# Patient Record
Sex: MALE | Race: WHITE | Employment: FULL TIME | URBAN - METROPOLITAN AREA
[De-identification: names, ages, dates, MRNs, and addresses within clinical notes are randomized per-mention and may not be internally consistent; named-entity substitution may affect disease eponyms.]

---

## 2019-12-14 ENCOUNTER — APPOINTMENT (OUTPATIENT)
Dept: NON INVASIVE DIAGNOSTICS | Age: 49
DRG: 247 | End: 2019-12-14
Attending: INTERNAL MEDICINE
Payer: COMMERCIAL

## 2019-12-14 ENCOUNTER — HOSPITAL ENCOUNTER (INPATIENT)
Age: 49
LOS: 3 days | Discharge: HOME | DRG: 247 | End: 2019-12-17
Attending: EMERGENCY MEDICINE | Admitting: HOSPITALIST
Payer: COMMERCIAL

## 2019-12-14 ENCOUNTER — APPOINTMENT (OUTPATIENT)
Dept: GENERAL RADIOLOGY | Age: 49
DRG: 247 | End: 2019-12-14
Attending: EMERGENCY MEDICINE
Payer: COMMERCIAL

## 2019-12-14 DIAGNOSIS — I21.4 NON-STEMI (NON-ST ELEVATED MYOCARDIAL INFARCTION) (HCC): ICD-10-CM

## 2019-12-14 DIAGNOSIS — I24.9 ACS (ACUTE CORONARY SYNDROME) (HCC): ICD-10-CM

## 2019-12-14 DIAGNOSIS — I21.4 NSTEMI (NON-ST ELEVATED MYOCARDIAL INFARCTION) (HCC): Primary | ICD-10-CM

## 2019-12-14 LAB
ALBUMIN SERPL-MCNC: 3.8 G/DL (ref 3.5–5)
ALBUMIN/GLOB SERPL: 1.2 {RATIO} (ref 1.1–2.2)
ALP SERPL-CCNC: 118 U/L (ref 45–117)
ALT SERPL-CCNC: 24 U/L (ref 12–78)
ANION GAP SERPL CALC-SCNC: 7 MMOL/L (ref 5–15)
ANION GAP SERPL CALC-SCNC: 8 MMOL/L (ref 5–15)
APTT PPP: 23.7 SEC (ref 22.1–32)
AST SERPL-CCNC: 15 U/L (ref 15–37)
ATRIAL RATE: 62 BPM
ATRIAL RATE: 68 BPM
AV VELOCITY RATIO: 1.03
AV VTI RATIO: 1
BASOPHILS # BLD: 0 K/UL (ref 0–0.1)
BASOPHILS # BLD: 0 K/UL (ref 0–0.1)
BASOPHILS NFR BLD: 0 % (ref 0–1)
BASOPHILS NFR BLD: 0 % (ref 0–1)
BILIRUB SERPL-MCNC: 0.6 MG/DL (ref 0.2–1)
BUN SERPL-MCNC: 15 MG/DL (ref 6–20)
BUN SERPL-MCNC: 16 MG/DL (ref 6–20)
BUN/CREAT SERPL: 16 (ref 12–20)
BUN/CREAT SERPL: 19 (ref 12–20)
CALCIUM SERPL-MCNC: 9 MG/DL (ref 8.5–10.1)
CALCIUM SERPL-MCNC: 9.1 MG/DL (ref 8.5–10.1)
CALCULATED P AXIS, ECG09: 17 DEGREES
CALCULATED P AXIS, ECG09: 22 DEGREES
CALCULATED R AXIS, ECG10: 23 DEGREES
CALCULATED R AXIS, ECG10: 52 DEGREES
CALCULATED T AXIS, ECG11: 68 DEGREES
CALCULATED T AXIS, ECG11: 79 DEGREES
CHLORIDE SERPL-SCNC: 103 MMOL/L (ref 97–108)
CHLORIDE SERPL-SCNC: 105 MMOL/L (ref 97–108)
CHOLEST SERPL-MCNC: 139 MG/DL
CK SERPL-CCNC: 138 U/L (ref 39–308)
CO2 SERPL-SCNC: 26 MMOL/L (ref 21–32)
CO2 SERPL-SCNC: 28 MMOL/L (ref 21–32)
CREAT SERPL-MCNC: 0.79 MG/DL (ref 0.7–1.3)
CREAT SERPL-MCNC: 0.98 MG/DL (ref 0.7–1.3)
DIAGNOSIS, 93000: NORMAL
DIAGNOSIS, 93000: NORMAL
DIFFERENTIAL METHOD BLD: NORMAL
DIFFERENTIAL METHOD BLD: NORMAL
ECHO AO ROOT DIAM: 3.61 CM
ECHO AV AREA PEAK VELOCITY: 3.9 CM2
ECHO AV AREA VTI: 3.9 CM2
ECHO AV MEAN GRADIENT: 2.9 MMHG
ECHO AV MEAN VELOCITY: 0.84 M/S
ECHO AV PEAK GRADIENT: 4.3 MMHG
ECHO AV PEAK VELOCITY: 103.91 CM/S
ECHO AV VTI: 19.22 CM
ECHO LA MAJOR AXIS: 5.89 CM
ECHO LA TO AORTIC ROOT RATIO: 1.63
ECHO LV E' LATERAL VELOCITY: 9.05 CM/S
ECHO LV E' SEPTAL VELOCITY: 9.21 CM/S
ECHO LV INTERNAL DIMENSION DIASTOLIC: 4.8 CM (ref 4.2–5.9)
ECHO LV INTERNAL DIMENSION SYSTOLIC: 3.06 CM
ECHO LV IVSD: 1.47 CM (ref 0.6–1)
ECHO LV MASS 2D: 344 G (ref 88–224)
ECHO LV MASS INDEX 2D: 134.3 G/M2 (ref 49–115)
ECHO LV POSTERIOR WALL DIASTOLIC: 1.42 CM (ref 0.6–1)
ECHO LVOT CARDIAC OUTPUT: 4.4 L/MIN
ECHO LVOT DIAM: 2.19 CM
ECHO LVOT PEAK GRADIENT: 4.6 MMHG
ECHO LVOT PEAK VELOCITY: 107.37 CM/S
ECHO LVOT SV: 74.3 ML
ECHO LVOT VTI: 19.78 CM
ECHO MV A VELOCITY: 52.61 CM/S
ECHO MV AREA PHT: 4 CM2
ECHO MV AREA VTI: 4.5 CM2
ECHO MV E DECELERATION TIME (DT): 210.2 MS
ECHO MV E VELOCITY: 47.4 CM/S
ECHO MV E/A RATIO: 0.9
ECHO MV E/E' LATERAL: 5.24
ECHO MV E/E' RATIO (AVERAGED): 5.19
ECHO MV E/E' SEPTAL: 5.15
ECHO MV MAX VELOCITY: 54.35 CM/S
ECHO MV MEAN GRADIENT: 0.4 MMHG
ECHO MV MEAN INFLOW VELOCITY: 0.28 M/S
ECHO MV PEAK GRADIENT: 1.2 MMHG
ECHO MV PRESSURE HALF TIME (PHT): 55.2 MS
ECHO MV VTI: 16.58 CM
ECHO PV MAX VELOCITY: 95.02 CM/S
ECHO PV MEAN GRADIENT: 2.1 MMHG
ECHO PV PEAK GRADIENT: 3.6 MMHG
ECHO PV VTI: 16.66 CM
ECHO TV A WAVE: 47.15 CM/S
ECHO TV E WAVE: 57.18 CM/S
ECHO TV EROA: 0.8
EOSINOPHIL # BLD: 0.1 K/UL (ref 0–0.4)
EOSINOPHIL # BLD: 0.1 K/UL (ref 0–0.4)
EOSINOPHIL NFR BLD: 1 % (ref 0–7)
EOSINOPHIL NFR BLD: 1 % (ref 0–7)
ERYTHROCYTE [DISTWIDTH] IN BLOOD BY AUTOMATED COUNT: 12.3 % (ref 11.5–14.5)
ERYTHROCYTE [DISTWIDTH] IN BLOOD BY AUTOMATED COUNT: 12.5 % (ref 11.5–14.5)
EST. AVERAGE GLUCOSE BLD GHB EST-MCNC: 295 MG/DL
GLOBULIN SER CALC-MCNC: 3.1 G/DL (ref 2–4)
GLUCOSE BLD STRIP.AUTO-MCNC: 189 MG/DL (ref 65–100)
GLUCOSE BLD STRIP.AUTO-MCNC: 222 MG/DL (ref 65–100)
GLUCOSE BLD STRIP.AUTO-MCNC: 272 MG/DL (ref 65–100)
GLUCOSE BLD STRIP.AUTO-MCNC: 273 MG/DL (ref 65–100)
GLUCOSE SERPL-MCNC: 305 MG/DL (ref 65–100)
GLUCOSE SERPL-MCNC: 374 MG/DL (ref 65–100)
HBA1C MFR BLD: 11.9 % (ref 4–5.6)
HCT VFR BLD AUTO: 43.6 % (ref 36.6–50.3)
HCT VFR BLD AUTO: 47.3 % (ref 36.6–50.3)
HDLC SERPL-MCNC: 38 MG/DL
HDLC SERPL: 3.7 {RATIO} (ref 0–5)
HGB BLD-MCNC: 14.8 G/DL (ref 12.1–17)
HGB BLD-MCNC: 15.7 G/DL (ref 12.1–17)
IMM GRANULOCYTES # BLD AUTO: 0 K/UL (ref 0–0.04)
IMM GRANULOCYTES # BLD AUTO: 0 K/UL (ref 0–0.04)
IMM GRANULOCYTES NFR BLD AUTO: 0 % (ref 0–0.5)
IMM GRANULOCYTES NFR BLD AUTO: 0 % (ref 0–0.5)
INR BLD: 1 (ref 0.9–1.2)
LDLC SERPL CALC-MCNC: 71.4 MG/DL (ref 0–100)
LIPID PROFILE,FLP: NORMAL
LVFS 2D: 36.34 %
LVOT MG: 2.35 MMHG
LVOT MV: 0.72 CM/S
LYMPHOCYTES # BLD: 3.1 K/UL (ref 0.8–3.5)
LYMPHOCYTES # BLD: 3.1 K/UL (ref 0.8–3.5)
LYMPHOCYTES NFR BLD: 34 % (ref 12–49)
LYMPHOCYTES NFR BLD: 34 % (ref 12–49)
MCH RBC QN AUTO: 29.7 PG (ref 26–34)
MCH RBC QN AUTO: 29.9 PG (ref 26–34)
MCHC RBC AUTO-ENTMCNC: 33.2 G/DL (ref 30–36.5)
MCHC RBC AUTO-ENTMCNC: 33.9 G/DL (ref 30–36.5)
MCV RBC AUTO: 88.1 FL (ref 80–99)
MCV RBC AUTO: 89.6 FL (ref 80–99)
MONOCYTES # BLD: 0.7 K/UL (ref 0–1)
MONOCYTES # BLD: 0.7 K/UL (ref 0–1)
MONOCYTES NFR BLD: 7 % (ref 5–13)
MONOCYTES NFR BLD: 8 % (ref 5–13)
MV DEC SLOPE: 2.25
NEUTS SEG # BLD: 5.2 K/UL (ref 1.8–8)
NEUTS SEG # BLD: 5.3 K/UL (ref 1.8–8)
NEUTS SEG NFR BLD: 57 % (ref 32–75)
NEUTS SEG NFR BLD: 58 % (ref 32–75)
NRBC # BLD: 0 K/UL (ref 0–0.01)
NRBC # BLD: 0 K/UL (ref 0–0.01)
NRBC BLD-RTO: 0 PER 100 WBC
NRBC BLD-RTO: 0 PER 100 WBC
P-R INTERVAL, ECG05: 172 MS
P-R INTERVAL, ECG05: 180 MS
PLATELET # BLD AUTO: 279 K/UL (ref 150–400)
PLATELET # BLD AUTO: 309 K/UL (ref 150–400)
PMV BLD AUTO: 9.8 FL (ref 8.9–12.9)
PMV BLD AUTO: 9.8 FL (ref 8.9–12.9)
POTASSIUM SERPL-SCNC: 3.9 MMOL/L (ref 3.5–5.1)
POTASSIUM SERPL-SCNC: 4.3 MMOL/L (ref 3.5–5.1)
PROT SERPL-MCNC: 6.9 G/DL (ref 6.4–8.2)
Q-T INTERVAL, ECG07: 396 MS
Q-T INTERVAL, ECG07: 402 MS
QRS DURATION, ECG06: 84 MS
QRS DURATION, ECG06: 88 MS
QTC CALCULATION (BEZET), ECG08: 401 MS
QTC CALCULATION (BEZET), ECG08: 427 MS
RBC # BLD AUTO: 4.95 M/UL (ref 4.1–5.7)
RBC # BLD AUTO: 5.28 M/UL (ref 4.1–5.7)
SERVICE CMNT-IMP: ABNORMAL
SODIUM SERPL-SCNC: 138 MMOL/L (ref 136–145)
SODIUM SERPL-SCNC: 139 MMOL/L (ref 136–145)
THERAPEUTIC RANGE,PTTT: NORMAL SECS (ref 58–77)
TRIGL SERPL-MCNC: 148 MG/DL (ref ?–150)
TROPONIN I SERPL-MCNC: 1.6 NG/ML
TROPONIN I SERPL-MCNC: 1.87 NG/ML
VENTRICULAR RATE, ECG03: 62 BPM
VENTRICULAR RATE, ECG03: 68 BPM
VLDLC SERPL CALC-MCNC: 29.6 MG/DL
WBC # BLD AUTO: 9.1 K/UL (ref 4.1–11.1)
WBC # BLD AUTO: 9.2 K/UL (ref 4.1–11.1)

## 2019-12-14 PROCEDURE — 74011250636 HC RX REV CODE- 250/636: Performed by: EMERGENCY MEDICINE

## 2019-12-14 PROCEDURE — 83036 HEMOGLOBIN GLYCOSYLATED A1C: CPT

## 2019-12-14 PROCEDURE — 84484 ASSAY OF TROPONIN QUANT: CPT

## 2019-12-14 PROCEDURE — 99283 EMERGENCY DEPT VISIT LOW MDM: CPT

## 2019-12-14 PROCEDURE — 85610 PROTHROMBIN TIME: CPT

## 2019-12-14 PROCEDURE — 99153 MOD SED SAME PHYS/QHP EA: CPT | Performed by: INTERNAL MEDICINE

## 2019-12-14 PROCEDURE — 85730 THROMBOPLASTIN TIME PARTIAL: CPT

## 2019-12-14 PROCEDURE — 74011636320 HC RX REV CODE- 636/320: Performed by: INTERNAL MEDICINE

## 2019-12-14 PROCEDURE — 74011250637 HC RX REV CODE- 250/637: Performed by: EMERGENCY MEDICINE

## 2019-12-14 PROCEDURE — B2131ZZ FLUOROSCOPY OF MULTIPLE CORONARY ARTERY BYPASS GRAFTS USING LOW OSMOLAR CONTRAST: ICD-10-PCS | Performed by: INTERNAL MEDICINE

## 2019-12-14 PROCEDURE — C1887 CATHETER, GUIDING: HCPCS | Performed by: INTERNAL MEDICINE

## 2019-12-14 PROCEDURE — 71046 X-RAY EXAM CHEST 2 VIEWS: CPT

## 2019-12-14 PROCEDURE — C1725 CATH, TRANSLUMIN NON-LASER: HCPCS | Performed by: INTERNAL MEDICINE

## 2019-12-14 PROCEDURE — 80061 LIPID PANEL: CPT

## 2019-12-14 PROCEDURE — 65660000000 HC RM CCU STEPDOWN

## 2019-12-14 PROCEDURE — 74011000250 HC RX REV CODE- 250: Performed by: INTERNAL MEDICINE

## 2019-12-14 PROCEDURE — 77030013797 HC KT TRNSDUC PRSSR EDWD -A: Performed by: INTERNAL MEDICINE

## 2019-12-14 PROCEDURE — 74011250636 HC RX REV CODE- 250/636: Performed by: INTERNAL MEDICINE

## 2019-12-14 PROCEDURE — 77030004549 HC CATH ANGI DX PRF MRTM -A: Performed by: INTERNAL MEDICINE

## 2019-12-14 PROCEDURE — C1769 GUIDE WIRE: HCPCS | Performed by: INTERNAL MEDICINE

## 2019-12-14 PROCEDURE — B2151ZZ FLUOROSCOPY OF LEFT HEART USING LOW OSMOLAR CONTRAST: ICD-10-PCS | Performed by: INTERNAL MEDICINE

## 2019-12-14 PROCEDURE — C8929 TTE W OR WO FOL WCON,DOPPLER: HCPCS

## 2019-12-14 PROCEDURE — 77030002996 HC SUT SLK J&J -A: Performed by: INTERNAL MEDICINE

## 2019-12-14 PROCEDURE — 74011250637 HC RX REV CODE- 250/637: Performed by: HOSPITALIST

## 2019-12-14 PROCEDURE — 74011250637 HC RX REV CODE- 250/637: Performed by: INTERNAL MEDICINE

## 2019-12-14 PROCEDURE — 82550 ASSAY OF CK (CPK): CPT

## 2019-12-14 PROCEDURE — 92943 PRQ TRLUML REVSC CH OCC ANT: CPT | Performed by: INTERNAL MEDICINE

## 2019-12-14 PROCEDURE — C1874 STENT, COATED/COV W/DEL SYS: HCPCS | Performed by: INTERNAL MEDICINE

## 2019-12-14 PROCEDURE — 93459 L HRT ART/GRFT ANGIO: CPT | Performed by: INTERNAL MEDICINE

## 2019-12-14 PROCEDURE — 93005 ELECTROCARDIOGRAM TRACING: CPT

## 2019-12-14 PROCEDURE — B2111ZZ FLUOROSCOPY OF MULTIPLE CORONARY ARTERIES USING LOW OSMOLAR CONTRAST: ICD-10-PCS | Performed by: INTERNAL MEDICINE

## 2019-12-14 PROCEDURE — 4A023N7 MEASUREMENT OF CARDIAC SAMPLING AND PRESSURE, LEFT HEART, PERCUTANEOUS APPROACH: ICD-10-PCS | Performed by: INTERNAL MEDICINE

## 2019-12-14 PROCEDURE — 36415 COLL VENOUS BLD VENIPUNCTURE: CPT

## 2019-12-14 PROCEDURE — 77030004543 HC CATH ANGI DX MRTM -A: Performed by: INTERNAL MEDICINE

## 2019-12-14 PROCEDURE — 77030030195 HC CATH ANGI DX PRF4 MRTM -A: Performed by: INTERNAL MEDICINE

## 2019-12-14 PROCEDURE — 027035Z DILATION OF CORONARY ARTERY, ONE ARTERY WITH TWO DRUG-ELUTING INTRALUMINAL DEVICES, PERCUTANEOUS APPROACH: ICD-10-PCS | Performed by: INTERNAL MEDICINE

## 2019-12-14 PROCEDURE — 77030018729 HC ELECTRD DEFIB PAD CARD -B: Performed by: INTERNAL MEDICINE

## 2019-12-14 PROCEDURE — 74011000258 HC RX REV CODE- 258: Performed by: INTERNAL MEDICINE

## 2019-12-14 PROCEDURE — 74011636637 HC RX REV CODE- 636/637: Performed by: HOSPITALIST

## 2019-12-14 PROCEDURE — 80053 COMPREHEN METABOLIC PANEL: CPT

## 2019-12-14 PROCEDURE — 85025 COMPLETE CBC W/AUTO DIFF WBC: CPT

## 2019-12-14 PROCEDURE — 99152 MOD SED SAME PHYS/QHP 5/>YRS: CPT | Performed by: INTERNAL MEDICINE

## 2019-12-14 PROCEDURE — C1894 INTRO/SHEATH, NON-LASER: HCPCS | Performed by: INTERNAL MEDICINE

## 2019-12-14 PROCEDURE — 82962 GLUCOSE BLOOD TEST: CPT

## 2019-12-14 DEVICE — STENT RONYX25038UX RESOLUTE ONYX 2.50X38
Type: IMPLANTABLE DEVICE | Status: FUNCTIONAL
Brand: RESOLUTE ONYX™

## 2019-12-14 RX ORDER — HEPARIN SODIUM 200 [USP'U]/100ML
INJECTION, SOLUTION INTRAVENOUS
Status: COMPLETED | OUTPATIENT
Start: 2019-12-14 | End: 2019-12-14

## 2019-12-14 RX ORDER — OXYCODONE HYDROCHLORIDE 5 MG/1
5 TABLET ORAL
Status: DISCONTINUED | OUTPATIENT
Start: 2019-12-14 | End: 2019-12-17 | Stop reason: HOSPADM

## 2019-12-14 RX ORDER — SODIUM CHLORIDE 0.9 % (FLUSH) 0.9 %
5-40 SYRINGE (ML) INJECTION AS NEEDED
Status: DISCONTINUED | OUTPATIENT
Start: 2019-12-14 | End: 2019-12-14 | Stop reason: SDUPTHER

## 2019-12-14 RX ORDER — INSULIN LISPRO 100 [IU]/ML
INJECTION, SOLUTION INTRAVENOUS; SUBCUTANEOUS
Status: DISCONTINUED | OUTPATIENT
Start: 2019-12-14 | End: 2019-12-17 | Stop reason: HOSPADM

## 2019-12-14 RX ORDER — FENTANYL CITRATE 50 UG/ML
25 INJECTION, SOLUTION INTRAMUSCULAR; INTRAVENOUS
Status: DISCONTINUED | OUTPATIENT
Start: 2019-12-14 | End: 2019-12-17 | Stop reason: HOSPADM

## 2019-12-14 RX ORDER — SODIUM CHLORIDE 0.9 % (FLUSH) 0.9 %
5-40 SYRINGE (ML) INJECTION AS NEEDED
Status: DISCONTINUED | OUTPATIENT
Start: 2019-12-14 | End: 2019-12-17 | Stop reason: HOSPADM

## 2019-12-14 RX ORDER — ASPIRIN 81 MG/1
81 TABLET ORAL DAILY
Status: DISCONTINUED | OUTPATIENT
Start: 2019-12-14 | End: 2019-12-17 | Stop reason: HOSPADM

## 2019-12-14 RX ORDER — MIDAZOLAM HYDROCHLORIDE 1 MG/ML
INJECTION, SOLUTION INTRAMUSCULAR; INTRAVENOUS AS NEEDED
Status: DISCONTINUED | OUTPATIENT
Start: 2019-12-14 | End: 2019-12-14 | Stop reason: HOSPADM

## 2019-12-14 RX ORDER — ACETAMINOPHEN 325 MG/1
650 TABLET ORAL
Status: DISCONTINUED | OUTPATIENT
Start: 2019-12-14 | End: 2019-12-17 | Stop reason: HOSPADM

## 2019-12-14 RX ORDER — HEPARIN SODIUM 5000 [USP'U]/ML
4000 INJECTION, SOLUTION INTRAVENOUS; SUBCUTANEOUS AS NEEDED
Status: DISCONTINUED | OUTPATIENT
Start: 2019-12-14 | End: 2019-12-17 | Stop reason: HOSPADM

## 2019-12-14 RX ORDER — ENOXAPARIN SODIUM 100 MG/ML
1 INJECTION SUBCUTANEOUS
Status: DISCONTINUED | OUTPATIENT
Start: 2019-12-14 | End: 2019-12-14

## 2019-12-14 RX ORDER — ONDANSETRON 2 MG/ML
4 INJECTION INTRAMUSCULAR; INTRAVENOUS
Status: DISCONTINUED | OUTPATIENT
Start: 2019-12-14 | End: 2019-12-17 | Stop reason: HOSPADM

## 2019-12-14 RX ORDER — ATORVASTATIN CALCIUM 40 MG/1
40 TABLET, FILM COATED ORAL
Status: DISCONTINUED | OUTPATIENT
Start: 2019-12-14 | End: 2019-12-17 | Stop reason: HOSPADM

## 2019-12-14 RX ORDER — SODIUM CHLORIDE 9 MG/ML
100 INJECTION, SOLUTION INTRAVENOUS CONTINUOUS
Status: DISPENSED | OUTPATIENT
Start: 2019-12-14 | End: 2019-12-14

## 2019-12-14 RX ORDER — METOPROLOL TARTRATE 25 MG/1
25 TABLET, FILM COATED ORAL EVERY 12 HOURS
Status: DISCONTINUED | OUTPATIENT
Start: 2019-12-14 | End: 2019-12-17 | Stop reason: HOSPADM

## 2019-12-14 RX ORDER — SODIUM CHLORIDE 0.9 % (FLUSH) 0.9 %
5-40 SYRINGE (ML) INJECTION EVERY 8 HOURS
Status: DISCONTINUED | OUTPATIENT
Start: 2019-12-14 | End: 2019-12-14 | Stop reason: SDUPTHER

## 2019-12-14 RX ORDER — METFORMIN HYDROCHLORIDE 1000 MG/1
1000 TABLET, FILM COATED, EXTENDED RELEASE ORAL 2 TIMES DAILY
COMMUNITY

## 2019-12-14 RX ORDER — LIDOCAINE HYDROCHLORIDE 10 MG/ML
INJECTION, SOLUTION EPIDURAL; INFILTRATION; INTRACAUDAL; PERINEURAL AS NEEDED
Status: DISCONTINUED | OUTPATIENT
Start: 2019-12-14 | End: 2019-12-14 | Stop reason: HOSPADM

## 2019-12-14 RX ORDER — DEXTROSE 50 % IN WATER (D50W) INTRAVENOUS SYRINGE
12.5-25 AS NEEDED
Status: DISCONTINUED | OUTPATIENT
Start: 2019-12-14 | End: 2019-12-17 | Stop reason: HOSPADM

## 2019-12-14 RX ORDER — MAGNESIUM SULFATE 100 %
4 CRYSTALS MISCELLANEOUS AS NEEDED
Status: DISCONTINUED | OUTPATIENT
Start: 2019-12-14 | End: 2019-12-17 | Stop reason: HOSPADM

## 2019-12-14 RX ORDER — FENTANYL CITRATE 50 UG/ML
INJECTION, SOLUTION INTRAMUSCULAR; INTRAVENOUS AS NEEDED
Status: DISCONTINUED | OUTPATIENT
Start: 2019-12-14 | End: 2019-12-14 | Stop reason: HOSPADM

## 2019-12-14 RX ORDER — HEPARIN SODIUM 5000 [USP'U]/ML
4000 INJECTION, SOLUTION INTRAVENOUS; SUBCUTANEOUS ONCE
Status: COMPLETED | OUTPATIENT
Start: 2019-12-14 | End: 2019-12-14

## 2019-12-14 RX ORDER — HEPARIN SODIUM 5000 [USP'U]/ML
2000 INJECTION, SOLUTION INTRAVENOUS; SUBCUTANEOUS AS NEEDED
Status: DISCONTINUED | OUTPATIENT
Start: 2019-12-14 | End: 2019-12-17 | Stop reason: HOSPADM

## 2019-12-14 RX ORDER — SODIUM CHLORIDE 0.9 % (FLUSH) 0.9 %
5-40 SYRINGE (ML) INJECTION EVERY 8 HOURS
Status: DISCONTINUED | OUTPATIENT
Start: 2019-12-14 | End: 2019-12-17 | Stop reason: HOSPADM

## 2019-12-14 RX ORDER — HEPARIN SODIUM 10000 [USP'U]/100ML
7-25 INJECTION, SOLUTION INTRAVENOUS
Status: DISCONTINUED | OUTPATIENT
Start: 2019-12-14 | End: 2019-12-17 | Stop reason: HOSPADM

## 2019-12-14 RX ORDER — INSULIN GLARGINE 100 [IU]/ML
20 INJECTION, SOLUTION SUBCUTANEOUS DAILY
Status: DISCONTINUED | OUTPATIENT
Start: 2019-12-14 | End: 2019-12-17 | Stop reason: HOSPADM

## 2019-12-14 RX ADMIN — INSULIN LISPRO 7 UNITS: 100 INJECTION, SOLUTION INTRAVENOUS; SUBCUTANEOUS at 08:30

## 2019-12-14 RX ADMIN — Medication 10 ML: at 22:04

## 2019-12-14 RX ADMIN — METOPROLOL TARTRATE 25 MG: 25 TABLET ORAL at 20:52

## 2019-12-14 RX ADMIN — BIVALIRUDIN 0.5 MG/KG/HR: 250 INJECTION, POWDER, LYOPHILIZED, FOR SOLUTION INTRAVENOUS at 12:15

## 2019-12-14 RX ADMIN — SODIUM CHLORIDE 100 ML/HR: 900 INJECTION, SOLUTION INTRAVENOUS at 09:43

## 2019-12-14 RX ADMIN — HEPARIN SODIUM 4000 UNITS: 5000 INJECTION INTRAVENOUS; SUBCUTANEOUS at 05:04

## 2019-12-14 RX ADMIN — METOPROLOL TARTRATE 25 MG: 25 TABLET ORAL at 08:30

## 2019-12-14 RX ADMIN — ASPIRIN 81 MG: 81 TABLET, COATED ORAL at 08:30

## 2019-12-14 RX ADMIN — PERFLUTREN 2 ML: 6.52 INJECTION, SUSPENSION INTRAVENOUS at 14:38

## 2019-12-14 RX ADMIN — HEPARIN SODIUM AND DEXTROSE 7 UNITS/KG/HR: 10000; 5 INJECTION INTRAVENOUS at 05:39

## 2019-12-14 RX ADMIN — NITROGLYCERIN 1 INCH: 20 OINTMENT TOPICAL at 04:24

## 2019-12-14 RX ADMIN — ACETAMINOPHEN 650 MG: 325 TABLET ORAL at 20:52

## 2019-12-14 RX ADMIN — INSULIN GLARGINE 20 UNITS: 100 INJECTION, SOLUTION SUBCUTANEOUS at 06:01

## 2019-12-14 RX ADMIN — TICAGRELOR 90 MG: 90 TABLET ORAL at 22:02

## 2019-12-14 RX ADMIN — ATORVASTATIN CALCIUM 40 MG: 40 TABLET, FILM COATED ORAL at 05:43

## 2019-12-14 RX ADMIN — ATORVASTATIN CALCIUM 40 MG: 40 TABLET, FILM COATED ORAL at 22:02

## 2019-12-14 RX ADMIN — INSULIN LISPRO 4 UNITS: 100 INJECTION, SOLUTION INTRAVENOUS; SUBCUTANEOUS at 22:03

## 2019-12-14 NOTE — ED PROVIDER NOTES
EMERGENCY DEPARTMENT HISTORY AND PHYSICAL EXAM      Date: 12/14/2019  Patient Name: Jerilyn Kumari  Patient Age and Sex: 47 y.o. male    History of Presenting Illness     Chief Complaint   Patient presents with    Chest Pain     x40 mins, center radiates to bilateral sides, and left jaw. Hx of 2 MI.  denies fever/chills. Pt reporting SOB with pain. Pt reprots he took an ASA 81mg PTA. History Provided By: Patient    HPI: Jerilyn Kumari, is a 47 y.o. male who has a past medical history of diabetes, hypertension, coronary artery disease, several MIs last of which was about 4 years ago and CABG X2, presents today with anterior dull chest pain, nonradiating, started around midnight when he was going to bed. Pain lasted 45 minutes to an hour and was similar to the pain he experienced during his last heart attack. Took a full aspirin nitro at home and in route to the emergency department which resolved the pain. Currently he is asymptomatic. He has had a few other episodes of exertional chest pain over the past few days. In addition, she has not been compliant with all of his medications, particularly treatment for his diabetes. Pt denies any other alleviating or exacerbating factors. There are no other complaints, changes or physical findings at this time.      Past Medical History:   Diagnosis Date    Diabetes ([address]     Hypertension     MI (myocardial infarction) ([address]     NSTEMI (non-ST elevated myocardial infarction) ([address] 1/6/2016    Obesity 1/6/2016     1/6/2016    Type II diabetes mellitus, uncontrolled ([address] 1/6/2016     Past Surgical History:   Procedure Laterality Date    HX CORONARY STENT PLACEMENT     [facility] ORTHOPAEDIC  [id number]    scopes knees, ankles,        PCP: Yashira Garcia MD    Past History   Past Medical History:  Past Medical History:   Diagnosis Date    Diabetes ([address]     Hypertension     MI (myocardial infarction) ([address]     NSTEMI (non-ST elevated myocardial infarction) ([facility] 1/6/2016    Obesity 1/6/2016     1/6/2016    Type II diabetes mellitus, uncontrolled ([facility] 1/6/2016       Past Surgical History:  Past Surgical History:   Procedure Laterality Date    HX CORONARY STENT PLACEMENT      HX ORTHOPAEDIC  [id number]    scopes knees, ankles,        Family History:  Family History   Problem Relation Age of Onset    Heart Disease Mother     Heart Disease Maternal Grandmother        Social History:  Social History     Tobacco Use    Smoking status: Former Smoker    Smokeless tobacco: Current User   Substance Use Topics    Alcohol use: No    Drug use: Not on file       Allergies: Allergies   Allergen Reactions    Latex Rash       Current Medications:  No current facility-administered medications on file prior to encounter. Current Outpatient Medications on File Prior to Encounter   Medication Sig Dispense Refill    metoprolol tartrate (LOPRESSOR) 25 mg tablet TAKE 1 TABLET BY MOUTH EVERY 12 HOURS 60 Tab 0    glucose blood VI test strips (ONETOUCH ULTRA TEST) strip Use four times daily as directed 100 Strip 1    Insulin Needles, Disposable, (BD INSULIN PEN NEEDLE UF MINI) 31 gauge x 3/16\" ndle Use as directed. May substitute with 5/16\" 100 Pen Needle 1    lisinopril (PRINIVIL, ZESTRIL) 5 mg tablet Take 1 Tab by mouth daily. Indications: HYPERTENSION 30 Tab 11    atorvastatin (LIPITOR) 40 mg tablet Take 1 Tab by mouth nightly. 30 Tab 11    ondansetron (ZOFRAN ODT) 4 mg disintegrating tablet Take 1 Tab by mouth every six (6) hours as needed for up to 20 doses.  20 Tab 1    insulin lispro (HUMALOG) 100 unit/mL injection 10 units SC before meals  Indications: TYPE 2 DIABETES MELLITUS 1 Vial 1    insulin glargine (LANTUS) 100 unit/mL injection 50 units SC daily  Indications: TYPE 2 DIABETES MELLITUS (Patient taking differently: 48 units SC daily  Indications: TYPE 2 DIABETES MELLITUS) 1 Vial 1    HYDROcodone-acetaminophen (NORCO) 5-325 mg per tablet Take 1 Tab by mouth every four (4) hours as needed for Pain. Max Daily Amount: 6 Tabs. 60 Tab 0    lancets (ONETOUCH DELICA LANCETS) 30 gauge misc Use twice daily as directed 100 Package 1    aspirin delayed-release (ECOTRIN LOW STRENGTH) 81 mg tablet Take 1 Tab by mouth daily. 30 Tab 11       Review of Systems   Review of Systems   Constitutional: Negative for appetite change, chills and fever. Respiratory: Negative for cough, chest tightness and shortness of breath. Cardiovascular: Positive for chest pain. Negative for palpitations and leg swelling. Gastrointestinal: Negative for abdominal distention, abdominal pain, blood in stool, constipation, diarrhea, nausea and vomiting. Genitourinary: Negative for decreased urine volume, difficulty urinating, dysuria, flank pain, frequency and hematuria. Musculoskeletal: Negative for arthralgias, joint swelling, myalgias, neck pain and neck stiffness. Neurological: Negative for dizziness, weakness, light-headedness, numbness and headaches. Hematological: Negative for adenopathy. All other systems reviewed and are negative. Physical Exam   Physical Exam  Vitals signs and nursing note reviewed. Constitutional:       Appearance: He is well-developed. He is not diaphoretic. HENT:      Head: Atraumatic. Eyes:      General: No scleral icterus. Conjunctiva/sclera: Conjunctivae normal.      Pupils: Pupils are equal, round, and reactive to light. Neck:      Musculoskeletal: Normal range of motion and neck supple. Vascular: No JVD. Cardiovascular:      Rate and Rhythm: Normal rate and regular rhythm. Heart sounds: Normal heart sounds. Pulmonary:      Effort: Pulmonary effort is normal.      Breath sounds: Normal breath sounds. Chest:      Chest wall: No tenderness. Abdominal:      General: Bowel sounds are normal. There is no distension. Palpations: Abdomen is soft. Tenderness:  There is no tenderness. Musculoskeletal: Normal range of motion. Skin:     General: Skin is warm and dry. Neurological:      Mental Status: He is alert and oriented to person, place, and time. Cranial Nerves: No cranial nerve deficit. Diagnostic Study Results     Labs -  Recent Results (from the past 24 hour(s))   EKG, 12 LEAD, INITIAL    Collection Time: 12/14/19  1:19 AM   Result Value Ref Range    Ventricular Rate 68 BPM    Atrial Rate 68 BPM    P-R Interval 172 ms    QRS Duration 88 ms    Q-T Interval 402 ms    QTC Calculation (Bezet) 427 ms    Calculated P Axis 17 degrees    Calculated R Axis 52 degrees    Calculated T Axis 79 degrees    Diagnosis       Normal sinus rhythm  Anteroseptal infarct , age undetermined  When compared with ECG of 08-JAN-2016 12:30,  Anteroseptal infarct is now present  ST now depressed in Lateral leads  Nonspecific T wave abnormality has replaced inverted T waves in Inferior   leads  Nonspecific T wave abnormality no longer evident in Anterior leads     CBC WITH AUTOMATED DIFF    Collection Time: 12/14/19  2:42 AM   Result Value Ref Range    WBC 9.2 4.1 - 11.1 K/uL    RBC 5.28 4.10 - 5.70 M/uL    HGB 15.7 12.1 - 17.0 g/dL    HCT 47.3 36.6 - 50.3 %    MCV 89.6 80.0 - 99.0 FL    MCH 29.7 26.0 - 34.0 PG    MCHC 33.2 30.0 - 36.5 g/dL    RDW 12.5 11.5 - 14.5 %    PLATELET 729 956 - 486 K/uL    MPV 9.8 8.9 - 12.9 FL    NRBC 0.0 0  WBC    ABSOLUTE NRBC 0.00 0.00 - 0.01 K/uL    NEUTROPHILS 58 32 - 75 %    LYMPHOCYTES 34 12 - 49 %    MONOCYTES 7 5 - 13 %    EOSINOPHILS 1 0 - 7 %    BASOPHILS 0 0 - 1 %    IMMATURE GRANULOCYTES 0 0.0 - 0.5 %    ABS. NEUTROPHILS 5.3 1.8 - 8.0 K/UL    ABS. LYMPHOCYTES 3.1 0.8 - 3.5 K/UL    ABS. MONOCYTES 0.7 0.0 - 1.0 K/UL    ABS. EOSINOPHILS 0.1 0.0 - 0.4 K/UL    ABS. BASOPHILS 0.0 0.0 - 0.1 K/UL    ABS. IMM.  GRANS. 0.0 0.00 - 0.04 K/UL    DF AUTOMATED     METABOLIC PANEL, COMPREHENSIVE    Collection Time: 12/14/19  2:42 AM   Result Value Ref Range Sodium 138 136 - 145 mmol/L    Potassium 4.3 3.5 - 5.1 mmol/L    Chloride 103 97 - 108 mmol/L    CO2 28 21 - 32 mmol/L    Anion gap 7 5 - 15 mmol/L    Glucose 374 (H) 65 - 100 mg/dL    BUN 16 6 - 20 MG/DL    Creatinine 0.98 0.70 - 1.30 MG/DL    BUN/Creatinine ratio 16 12 - 20      GFR est AA >60 >60 ml/min/1.73m2    GFR est non-AA >60 >60 ml/min/1.73m2    Calcium 9.0 8.5 - 10.1 MG/DL    Bilirubin, total 0.6 0.2 - 1.0 MG/DL    ALT (SGPT) 24 12 - 78 U/L    AST (SGOT) 15 15 - 37 U/L    Alk. phosphatase 118 (H) 45 - 117 U/L    Protein, total 6.9 6.4 - 8.2 g/dL    Albumin 3.8 3.5 - 5.0 g/dL    Globulin 3.1 2.0 - 4.0 g/dL    A-G Ratio 1.2 1.1 - 2.2     TROPONIN I    Collection Time: 12/14/19  2:42 AM   Result Value Ref Range    Troponin-I, Qt. 1.60 (H) <0.05 ng/mL   CK W/ REFLX CKMB    Collection Time: 12/14/19  2:42 AM   Result Value Ref Range     39 - 308 U/L       Radiologic Studies -   XR CHEST PA LAT   Final Result   IMPRESSION:   No acute process. XR CHEST PORT    (Results Pending)         Medical Decision Making   I am the first provider for this patient. Records Reviewed: I reviewed our electronic medical record system for any past medical records that were available that may contribute to the patient's current condition, including their PMH, surgical history, social and family history. Reviewed the nursing notes and vital signs from today's visit. Vital Signs-Reviewed the patient's vital signs. Patient Vitals for the past 24 hrs:   Temp Pulse Resp BP SpO2   12/14/19 0114 97.8 °F (36.6 °C) 65 20 154/82 99 %       Pulse Oximetry Analysis - 98% on RA    Cardiac Monitor:   Rate: 65 bpm  Rhythm: Normal Sinus Rhythm      ED ECG interpretation:  ECG shows normal sinus rhythm, with HR 68, normal intervals. 1mm ST elevation in lead III with reciprocal depressions in lateral leads I and aVL.  Does not meet STEMI criteria and the patient is currently asmptomatic, normal vital signs, appears very well. This ECG was interpreted by Maria D Lara M.D. Repeat ECG:  Sinus rhythm, normal rate and intervals. ST changes seen on first ECG are no longer present. This ECG was interpreted by Maria D Lara M.D. Provider Notes (Medical Decision Making): The patient is a 69-year-old gentleman with a history of advanced cardiac disease presents to the emergency room with chest pain that is concerning for cardiac ischemia. His ECG is abnormal showing evidence of possible ischemia as well, however the patient is currently chest pain-free and the ECG does not meet STEMI criteria which is why I proceeded with obtaining cardiac enzymes and watching him closely. Statin aspirin and nitro already. Cardiac work-up shows an elevated troponin which is not surprising given his presentation. In light of his history and dynamic ECG changes, I will call cardiology and discussed the patient with them as he may need to be catheterized. As this is a possibility, I will anticoagulated this point with heparin. Discussed him with the hospitalist as well and will admit to their service for further management. ED Course:   Initial assessment performed. The patients presenting problems have been discussed, and they are in agreement with the care plan formulated and outlined with them. I have encouraged them to ask questions as they arise throughout their visit. Medications Administered During ED Course:  Medications   nitroglycerin (NITROBID) 2 % ointment 1 Inch (has no administration in time range)   enoxaparin (LOVENOX) injection 140 mg (has no administration in time range)     DISPOSITION: ADMIT  Patient is being admitted to the hospital.  Their test results and reasons for admission have been discussed. The patient and/or available family express agreement with and understanding of the need to be admitted and their admission diagnosis. Thank you for resuming the care of this patient.   Please don't hesitate to contact me in the emergency department if you  have any additional questions. Salma Mckee MD, MSc    Diagnosis     Clinical Impression:   1. NSTEMI (non-ST elevated myocardial infarction) ([address]      CRITICAL CARE NOTE :    IMPENDING DETERIORATION -Cardiovascular  ASSOCIATED RISK FACTORS - MI  MANAGEMENT- Bedside Assessment  INTERPRETATION -  Xrays, ECG and Blood Pressure  INTERVENTIONS - hemodynamic mngmt and Metobolic interventions  CASE REVIEW - Hospitalist, Medical Sub-Specialist, Nursing and Family  TREATMENT RESPONSE -Improved  PERFORMED BY - Self    NOTES   :    I have spent 30 minutes of critical care time involved in lab review, consultations with specialist, family decision- making, bedside attention and documentation. During this entire length of time I was immediately available to the patient . Critical Care: The reason for providing this level of medical care for this critically ill patient was due to a critical illness that impaired one or more vital organ systems, such that there was a high probability of imminent or life threatening deterioration in the patient's condition. This care involved high complexity decision making to assess, manipulate, and support vital system functions, to treat this degree of vital organ system failure, and to prevent further life threatening deterioration of the patients condition. Salma Mckee MD    Attestation:  I personally performed the services described in this documentation on this date 12/14/2019 for patient Manas Troncoso. Salma Mckee MD    Please note that this dictation was completed with [employer], the computer voice recognition software. Quite often unanticipated grammatical, syntax, homophones, and other interpretive errors are inadvertently transcribed by the computer software. Please disregard these errors. Please excuse any errors that have escaped final proofreading.

## 2019-12-14 NOTE — Clinical Note
Sheath: left in place. Site secured by Tegaderm. Sheath connected to heparin pressure bag. Hemostasis achieved.

## 2019-12-14 NOTE — ED NOTES
Pt CC of CP mid chest that started tonight. Pt states that this happened 2 weeks ago. Pt denying SOB. Pain is not radiating. Pt has hx of DM, CAD, CABG, and HTN. Pt on monitor x3. VSS. Call bell in reach.

## 2019-12-14 NOTE — Clinical Note
Lesion: Located in the Mid RCA. Stent inserted. Single technique used. First inflation pressure = 12 armen; inflation time: 20 sec.

## 2019-12-14 NOTE — PROGRESS NOTES
Cath revealed 90% distal LM, 100% ostial LAD, 90% proximal LCX and 99% mid RCA. LIMA patent, SVG to % at origin. LVEF 55%. PCI/stent to RCA today. Staged PCI of protected  LM/LCX Monday.

## 2019-12-14 NOTE — Clinical Note
Lesion located in the Proximal RCA. Balloon inflated using multiple inflations inflation technique. Pressure = 10 armen; Duration = 15 sec. Inflation 2: Pressure: 10 armen; Duration: 6 sec. Inflation 3: Pressure: 10 armen; Duration: 15 sec. Inflation 4: Pressure: 15 armen; Duration: 11 sec.  Ballooned PROX thru MID RCA

## 2019-12-14 NOTE — Clinical Note
Notified of STEMI in ER, plan was to remove from table however not true STEMI, pt remained on table, redraped and Dr. Donnell Dowell in for intervention

## 2019-12-14 NOTE — Clinical Note
Lesion located in the Proximal RCA. Balloon inserted. Balloon inflated using single inflation technique. Pressure = 8 armen; Duration = 20 sec.

## 2019-12-14 NOTE — Clinical Note
Lesion located in the Proximal RCA. Balloon inserted. Balloon inflated using single inflation technique. Pressure = 12 armen; Duration = 10 sec.

## 2019-12-14 NOTE — Clinical Note
Single view of the aortic root obtained using power injection. Total volume = 40 mL. Rate = 20 mL/sec. Pressure = 900 PSI. Rate of rise = 0 sec.

## 2019-12-14 NOTE — ED NOTES
TRANSFER - OUT REPORT:    Verbal report given to Binh Rizzo (name) on Jose Maria Cutler  being transferred to PCU(unit) for routine progression of care       Report consisted of patients Situation, Background, Assessment and   Recommendations(SBAR). Information from the following report(s) SBAR, ED Summary, [facility] and Recent Results was reviewed with the receiving nurse. Lines:   Peripheral IV 12/14/19 Left Antecubital (Active)   Site Assessment Clean, dry, & intact 12/14/2019  7:19 AM   Phlebitis Assessment 0 12/14/2019  7:19 AM   Infiltration Assessment 0 12/14/2019  7:19 AM   Dressing Status Clean, dry, & intact 12/14/2019  7:19 AM   Dressing Type Transparent 12/14/2019  7:19 AM   Hub Color/Line Status Flushed 12/14/2019  7:19 AM        Opportunity for questions and clarification was provided.       Patient transported with:   Monitor  Registered Nurse

## 2019-12-14 NOTE — Clinical Note
Lesion: Located in the Proximal RCA. Stent inserted. First inflation pressure = 12 armen; inflation time: 15 sec.

## 2019-12-14 NOTE — Clinical Note
Lesion located in the Proximal RCA. Balloon inserted. Balloon inflated using multiple inflations inflation technique. Pressure = 8 armen; Duration = 10 sec. Inflation 2: Pressure: 8 armen; Duration: 10 sec. Inflation 3: Pressure: 8 armen; Duration: 5 sec.

## 2019-12-14 NOTE — Clinical Note
Stent inserted. Stent deployed. Multiple inflations used. First inflation pressure = 14 armen; inflation time: 15 sec.

## 2019-12-14 NOTE — CONSULTS
Cardiology Consult Note       Date of  Admission: 12/14/2019  2:53 AM     Admission type:Emergency     Subjective:     Mr. Breonna Henson is admitted with NSTEMI. Presented with chest pain. Has h/o CABG in 2016. No f/u since then. Admits to medical non compliance. DM is not well controlled. EKG revealed slight ST elevation in lead 3. He has a LIMA and SVG to OM. Pain free at the time of exam. On IV heparin. Has received ASA, beta blocker, statin.     Patient Active Problem List    Diagnosis Date Noted    CAD (coronary artery disease) 01/08/2016    S/P CABG x 2 01/08/2016    NSTEMI (non-ST elevated myocardial infarction) ([facility] [address] 01/06/2016    Obesity 01/06/2016     01/06/2016    Type II diabetes mellitus, uncontrolled ([address] 01/06/2016    Hypertension     Diabetes ([address]     MI (myocardial infarction) ([facility] [address]       Wratchford, Clayborn Lundborg, MD  Past Medical History:   Diagnosis Date    Diabetes ([address]     Hypertension     MI (myocardial infarction) ([address]     NSTEMI (non-ST elevated myocardial infarction) ([address] 1/6/2016    Obesity 1/6/2016     1/6/2016    Type II diabetes mellitus, uncontrolled ([address] 1/6/2016      Past Surgical History:   Procedure Laterality Date    HX CORONARY STENT PLACEMENT      HX ORTHOPAEDIC  [id number]    scopes knees, ankles,      Allergies   Allergen Reactions    Latex Rash      Family History   Problem Relation Age of Onset    Heart Disease Mother     Heart Disease Maternal Grandmother       Current Facility-Administered Medications   Medication Dose Route Frequency    sodium chloride (NS) flush 5-40 mL  5-40 mL IntraVENous Q8H    sodium chloride (NS) flush 5-40 mL  5-40 mL IntraVENous PRN    acetaminophen (TYLENOL) tablet 650 mg  650 mg Oral Q4H PRN    ondansetron (ZOFRAN) injection 4 mg  4 mg IntraVENous Q4H PRN    oxyCODONE IR (ROXICODONE) tablet 5 mg  5 mg Oral Q4H PRN    fentaNYL citrate (PF) injection 25 mcg  25 mcg IntraVENous Q4H PRN    aspirin delayed-release tablet 81 mg  81 mg Oral DAILY    atorvastatin (LIPITOR) tablet 40 mg  40 mg Oral QHS    metoprolol tartrate (LOPRESSOR) tablet 25 mg  25 mg Oral Q12H    insulin lispro (HUMALOG) injection   SubCUTAneous AC&HS    glucose chewable tablet 16 g  4 Tab Oral PRN    dextrose (D50W) injection syrg 12.5-25 g  12.5-25 g IntraVENous PRN    glucagon (GLUCAGEN) injection 1 mg  1 mg IntraMUSCular PRN    heparin 25,000 units in D5W 250 ml infusion  7-25 Units/kg/hr IntraVENous TITRATE    insulin glargine (LANTUS) injection 20 Units  20 Units SubCUTAneous DAILY    heparin (porcine) injection 2,000 Units  2,000 Units IntraVENous PRN    Or    heparin (porcine) injection 4,000 Units  4,000 Units IntraVENous PRN    0.9% sodium chloride infusion  100 mL/hr IntraVENous CONTINUOUS         Review of Symptoms:  A comprehensive review of systems was negative except for that written in the HPI. Physical Exam    Visit Vitals  /68   Pulse 66   Temp 98.4 °F (36.9 °C)   Resp 16   Ht 6' 3\" (1.905 m)   Wt 301 lb 2.4 oz (136.6 kg)   SpO2 96%   BMI 37.64 kg/m²     General Appearance:  Well developed, well nourished,alert and oriented x 3, and individual in no acute distress. Ears/Nose/Mouth/Throat:   Hearing grossly normal.         Neck: Supple. Chest:   Lungs clear to auscultation bilaterally. Cardiovascular:  Regular rate and rhythm, S1, S2 normal, no murmur. Abdomen:   Soft, non-tender, bowel sounds are active. Extremities: No edema bilaterally. Skin: Warm and dry.                Cardiographics    Telemetry: normal sinus rhythm  ECG: normal sinus rhythm,  ischemic changes noted in inferior leads  Echocardiogram: Not done    Labs:   Recent Results (from the past 24 hour(s))   EKG, 12 LEAD, INITIAL    Collection Time: 12/14/19  1:19 AM   Result Value Ref Range    Ventricular Rate 68 BPM    Atrial Rate 68 BPM    P-R Interval 172 ms    QRS Duration 88 ms    Q-T Interval 402 ms    QTC Calculation (Bezet) 427 ms    Calculated P Axis 17 degrees    Calculated R Axis 52 degrees    Calculated T Axis 79 degrees    Diagnosis       Normal sinus rhythm  Anteroseptal infarct , age undetermined  When compared with ECG of 08-JAN-2016 12:30,  Anteroseptal infarct is now present  ST now depressed in Lateral leads  Nonspecific T wave abnormality has replaced inverted T waves in Inferior   leads  Nonspecific T wave abnormality no longer evident in Anterior leads     CBC WITH AUTOMATED DIFF    Collection Time: 12/14/19  2:42 AM   Result Value Ref Range    WBC 9.2 4.1 - 11.1 K/uL    RBC 5.28 4.10 - 5.70 M/uL    HGB 15.7 12.1 - 17.0 g/dL    HCT 47.3 36.6 - 50.3 %    MCV 89.6 80.0 - 99.0 FL    MCH 29.7 26.0 - 34.0 PG    MCHC 33.2 30.0 - 36.5 g/dL    RDW 12.5 11.5 - 14.5 %    PLATELET 257 890 - 152 K/uL    MPV 9.8 8.9 - 12.9 FL    NRBC 0.0 0  WBC    ABSOLUTE NRBC 0.00 0.00 - 0.01 K/uL    NEUTROPHILS 58 32 - 75 %    LYMPHOCYTES 34 12 - 49 %    MONOCYTES 7 5 - 13 %    EOSINOPHILS 1 0 - 7 %    BASOPHILS 0 0 - 1 %    IMMATURE GRANULOCYTES 0 0.0 - 0.5 %    ABS. NEUTROPHILS 5.3 1.8 - 8.0 K/UL    ABS. LYMPHOCYTES 3.1 0.8 - 3.5 K/UL    ABS. MONOCYTES 0.7 0.0 - 1.0 K/UL    ABS. EOSINOPHILS 0.1 0.0 - 0.4 K/UL    ABS. BASOPHILS 0.0 0.0 - 0.1 K/UL    ABS. IMM. GRANS. 0.0 0.00 - 0.04 K/UL    DF AUTOMATED     METABOLIC PANEL, COMPREHENSIVE    Collection Time: 12/14/19  2:42 AM   Result Value Ref Range    Sodium 138 136 - 145 mmol/L    Potassium 4.3 3.5 - 5.1 mmol/L    Chloride 103 97 - 108 mmol/L    CO2 28 21 - 32 mmol/L    Anion gap 7 5 - 15 mmol/L    Glucose 374 (H) 65 - 100 mg/dL    BUN 16 6 - 20 MG/DL    Creatinine 0.98 0.70 - 1.30 MG/DL    BUN/Creatinine ratio 16 12 - 20      GFR est AA >60 >60 ml/min/1.73m2    GFR est non-AA >60 >60 ml/min/1.73m2    Calcium 9.0 8.5 - 10.1 MG/DL    Bilirubin, total 0.6 0.2 - 1.0 MG/DL    ALT (SGPT) 24 12 - 78 U/L    AST (SGOT) 15 15 - 37 U/L    Alk.  phosphatase 118 (H) 45 - 117 U/L Protein, total 6.9 6.4 - 8.2 g/dL    Albumin 3.8 3.5 - 5.0 g/dL    Globulin 3.1 2.0 - 4.0 g/dL    A-G Ratio 1.2 1.1 - 2.2     TROPONIN I    Collection Time: 12/14/19  2:42 AM   Result Value Ref Range    Troponin-I, Qt. 1.60 (H) <0.05 ng/mL   CK W/ REFLX CKMB    Collection Time: 12/14/19  2:42 AM   Result Value Ref Range     39 - 887 U/L   METABOLIC PANEL, BASIC    Collection Time: 12/14/19  4:44 AM   Result Value Ref Range    Sodium 139 136 - 145 mmol/L    Potassium 3.9 3.5 - 5.1 mmol/L    Chloride 105 97 - 108 mmol/L    CO2 26 21 - 32 mmol/L    Anion gap 8 5 - 15 mmol/L    Glucose 305 (H) 65 - 100 mg/dL    BUN 15 6 - 20 MG/DL    Creatinine 0.79 0.70 - 1.30 MG/DL    BUN/Creatinine ratio 19 12 - 20      GFR est AA >60 >60 ml/min/1.73m2    GFR est non-AA >60 >60 ml/min/1.73m2    Calcium 9.1 8.5 - 10.1 MG/DL   PTT    Collection Time: 12/14/19  4:44 AM   Result Value Ref Range    aPTT 23.7 22.1 - 32.0 sec    aPTT, therapeutic range     58.0 - 77.0 SECS   CBC WITH AUTOMATED DIFF    Collection Time: 12/14/19  4:44 AM   Result Value Ref Range    WBC 9.1 4.1 - 11.1 K/uL    RBC 4.95 4.10 - 5.70 M/uL    HGB 14.8 12.1 - 17.0 g/dL    HCT 43.6 36.6 - 50.3 %    MCV 88.1 80.0 - 99.0 FL    MCH 29.9 26.0 - 34.0 PG    MCHC 33.9 30.0 - 36.5 g/dL    RDW 12.3 11.5 - 14.5 %    PLATELET 084 640 - 676 K/uL    MPV 9.8 8.9 - 12.9 FL    NRBC 0.0 0  WBC    ABSOLUTE NRBC 0.00 0.00 - 0.01 K/uL    NEUTROPHILS 57 32 - 75 %    LYMPHOCYTES 34 12 - 49 %    MONOCYTES 8 5 - 13 %    EOSINOPHILS 1 0 - 7 %    BASOPHILS 0 0 - 1 %    IMMATURE GRANULOCYTES 0 0.0 - 0.5 %    ABS. NEUTROPHILS 5.2 1.8 - 8.0 K/UL    ABS. LYMPHOCYTES 3.1 0.8 - 3.5 K/UL    ABS. MONOCYTES 0.7 0.0 - 1.0 K/UL    ABS. EOSINOPHILS 0.1 0.0 - 0.4 K/UL    ABS. BASOPHILS 0.0 0.0 - 0.1 K/UL    ABS. IMM.  GRANS. 0.0 0.00 - 0.04 K/UL    DF AUTOMATED     TROPONIN I    Collection Time: 12/14/19  4:44 AM   Result Value Ref Range    Troponin-I, Qt. 1.87 (H) <0.05 ng/mL   POC INR Collection Time: 12/14/19  4:59 AM   Result Value Ref Range    INR (POC) 1.0 <1.2     EKG, 12 LEAD, INITIAL    Collection Time: 12/14/19  5:05 AM   Result Value Ref Range    Ventricular Rate 62 BPM    Atrial Rate 62 BPM    P-R Interval 180 ms    QRS Duration 84 ms    Q-T Interval 396 ms    QTC Calculation (Bezet) 401 ms    Calculated P Axis 22 degrees    Calculated R Axis 23 degrees    Calculated T Axis 68 degrees    Diagnosis       Normal sinus rhythm  Anteroseptal infarct , age undetermined  When compared with ECG of 14-DEC-2019 01:19,  MANUAL COMPARISON REQUIRED, DATA IS UNCONFIRMED     GLUCOSE, POC    Collection Time: 12/14/19  8:05 AM   Result Value Ref Range    Glucose (POC) 272 (H) 65 - 100 mg/dL    Performed by Sandi Mackey         Assessment:     Assessment:       Active Problems:    NSTEMI (non-ST elevated myocardial infarction) ([facility] [address] (1/6/2016)         Plan: Active Problems:    NSTEMI (non-ST elevated myocardial infarction) ([facility] [address] (1/6/2016)- cath/PCI today. Procedure, risks, alternatives discussed with patient and wife. Discussed medical compliance, diet, exercise. Advised smoking cessation. Thank you for the consult.       Tina Ruiz MD

## 2019-12-14 NOTE — Clinical Note
TRANSFER - OUT REPORT:     Verbal report given to: [place]. Report consisted of patient's Situation, Background, Assessment and   Recommendations(SBAR). Opportunity for questions and clarification was provided. Patient transported with a Registered Nurse. Patient transported to: IVCU.

## 2019-12-14 NOTE — Clinical Note
Lesion located in the Proximal RCA. Balloon inserted. Balloon inflated using multiple inflations inflation technique. Pressure = 10 armen; Duration = 15 sec. Inflation 2: Pressure: 10 armen; Duration: 16 sec. Inflation 3: Pressure: 10 armen; Duration: 10 sec.  Ballooned PROX thru MID RCA

## 2019-12-14 NOTE — Clinical Note
Lesion located in the Ostium LM. Balloon inflated using multiple inflations inflation technique. Pressure = 14 armen; Duration = 26 sec.

## 2019-12-14 NOTE — Clinical Note
Lesion located in the Proximal RCA. Balloon inserted. Balloon inflated using multiple inflations inflation technique. Pressure = 12 armen; Duration = 10 sec. Inflation 2: Pressure: 12 armen; Duration: 10 sec. Inflation 3: Pressure: 12 armen; Duration: 10 sec.  Post dilation from PROX thru MID RCA

## 2019-12-14 NOTE — PROGRESS NOTES
6fr sheath removed from the right femoral artery, manual pressure and quickclot held for 15 min. Upon release no oozing or hematoma noted. Groin inspected and care taken over by ANN Hutchinson R.N.

## 2019-12-14 NOTE — H&P
HISTORY AND PHYSICAL      PCP: Ann Johnson MD  History source: the patient      CC: chest pain      HPI: 47 y.o man w/ CAD s/p stenting and CABG, DM, HTN, tobacco use, who presents with chest pain. He developed acute-onset sharp substernal chest discomfort, non-radiating, without nausea or dyspnea. This lasted about 20 minutes. He had a similar episode two weeks ago and again a week ago, but these were with exertion. He chews tobacco and admits that he should be on insulin but stopped using it about a year ago. PMH/PSH:  Past Medical History:   Diagnosis Date    Diabetes ([facility] [address]     Hypertension     MI (myocardial infarction) ([facility] [address]     NSTEMI (non-ST elevated myocardial infarction) ([facility] [address] 1/6/2016    Obesity 1/6/2016     1/6/2016    Type II diabetes mellitus, uncontrolled ([facility] [address] 1/6/2016     Past Surgical History:   Procedure Laterality Date    HX CORONARY STENT PLACEMENT      HX ORTHOPAEDIC  [id number]    scopes knees, ankles,        Home meds:   Prior to Admission medications    Medication Sig Start Date End Date Taking? Authorizing Provider   metoprolol tartrate (LOPRESSOR) 25 mg tablet TAKE 1 TABLET BY MOUTH EVERY 12 HOURS 1/26/17   Gunnar POZO NP   glucose blood VI test strips (ONETOUCH ULTRA TEST) strip Use four times daily as directed 2/9/16   SELAM Vivas   Insulin Needles, Disposable, (BD INSULIN PEN NEEDLE UF MINI) 31 gauge x 3/16\" ndle Use as directed. May substitute with 5/16\" 2/9/16   SELAM Vivas   lisinopril (PRINIVIL, ZESTRIL) 5 mg tablet Take 1 Tab by mouth daily. Indications: HYPERTENSION 1/22/16   Gunnar POZO NP   atorvastatin (LIPITOR) 40 mg tablet Take 1 Tab by mouth nightly. 1/22/16   Joe Thomas NP   ondansetron (ZOFRAN ODT) 4 mg disintegrating tablet Take 1 Tab by mouth every six (6) hours as needed for up to 20 doses.  1/13/16   SELAM Vivas   insulin lispro (HUMALOG) 100 unit/mL injection 10 units SC before meals Indications: TYPE 2 DIABETES MELLITUS 1/13/16   SELAM Castillo   insulin glargine (LANTUS) 100 unit/mL injection 50 units SC daily  Indications: TYPE 2 DIABETES MELLITUS  Patient taking differently: 48 units SC daily  Indications: TYPE 2 DIABETES MELLITUS 1/13/16   SELAM Castillo   HYDROcodone-acetaminophen [facility]) 5-325 mg per tablet Take 1 Tab by mouth every four (4) hours as needed for Pain. Max Daily Amount: 6 Tabs. 1/13/16   SELAM Castillo   lancets [facility] DELICA LANCETS) 30 gauge misc Use twice daily as directed 1/13/16   SELAM Castillo   aspirin delayed-release (ECOTRIN LOW STRENGTH) 81 mg tablet Take 1 Tab by mouth daily. 1/13/16 1/12/17  SELAM Castillo       Allergies: Allergies   Allergen Reactions    Latex Rash       FH:  Family History   Problem Relation Age of Onset    Heart Disease Mother     Heart Disease Maternal Grandmother        SH:  Social History     Tobacco Use    Smoking status: Former Smoker    Smokeless tobacco: Current User   Substance Use Topics    Alcohol use: No       ROS: A comprehensive review of systems was negative except for that written in the HPI.       PHYSICAL EXAM:  Visit Vitals  /73   Pulse 67   Temp 97.8 °F (36.6 °C)   Resp 20   Ht 6' 3\" (1.905 m)   Wt 136.6 kg (301 lb 2.4 oz)   SpO2 99%   BMI 37.64 kg/m²       Gen: NAD, non-toxic, obese  HEENT: anicteric sclerae, normal conjunctiva, oropharynx clear, MM moist  Neck: supple, trachea midline, no adenopathy  Heart: RRR, no MRG, no JVD, no peripheral edema  Lungs: CTA b/l, non-labored respirations  Abd: soft, NT, ND, BS+, no organomegaly  Extr: warm  Skin: dry, no rash  Neuro: CN II-XII grossly intact, normal speech, moves all extremities  Psych: normal mood, appropriate affect      Labs/Imaging:  Recent Results (from the past 24 hour(s))   EKG, 12 LEAD, INITIAL    Collection Time: 12/14/19  1:19 AM   Result Value Ref Range    Ventricular Rate 68 BPM    Atrial Rate 68 BPM    P-R Interval 172 ms    QRS Duration 88 ms    Q-T Interval 402 ms    QTC Calculation (Bezet) 427 ms    Calculated P Axis 17 degrees    Calculated R Axis 52 degrees    Calculated T Axis 79 degrees    Diagnosis       Normal sinus rhythm  Anteroseptal infarct , age undetermined  When compared with ECG of 08-JAN-2016 12:30,  Anteroseptal infarct is now present  ST now depressed in Lateral leads  Nonspecific T wave abnormality has replaced inverted T waves in Inferior   leads  Nonspecific T wave abnormality no longer evident in Anterior leads     CBC WITH AUTOMATED DIFF    Collection Time: 12/14/19  2:42 AM   Result Value Ref Range    WBC 9.2 4.1 - 11.1 K/uL    RBC 5.28 4.10 - 5.70 M/uL    HGB 15.7 12.1 - 17.0 g/dL    HCT 47.3 36.6 - 50.3 %    MCV 89.6 80.0 - 99.0 FL    MCH 29.7 26.0 - 34.0 PG    MCHC 33.2 30.0 - 36.5 g/dL    RDW 12.5 11.5 - 14.5 %    PLATELET 098 488 - 215 K/uL    MPV 9.8 8.9 - 12.9 FL    NRBC 0.0 0  WBC    ABSOLUTE NRBC 0.00 0.00 - 0.01 K/uL    NEUTROPHILS 58 32 - 75 %    LYMPHOCYTES 34 12 - 49 %    MONOCYTES 7 5 - 13 %    EOSINOPHILS 1 0 - 7 %    BASOPHILS 0 0 - 1 %    IMMATURE GRANULOCYTES 0 0.0 - 0.5 %    ABS. NEUTROPHILS 5.3 1.8 - 8.0 K/UL    ABS. LYMPHOCYTES 3.1 0.8 - 3.5 K/UL    ABS. MONOCYTES 0.7 0.0 - 1.0 K/UL    ABS. EOSINOPHILS 0.1 0.0 - 0.4 K/UL    ABS. BASOPHILS 0.0 0.0 - 0.1 K/UL    ABS. IMM.  GRANS. 0.0 0.00 - 0.04 K/UL    DF AUTOMATED     METABOLIC PANEL, COMPREHENSIVE    Collection Time: 12/14/19  2:42 AM   Result Value Ref Range    Sodium 138 136 - 145 mmol/L    Potassium 4.3 3.5 - 5.1 mmol/L    Chloride 103 97 - 108 mmol/L    CO2 28 21 - 32 mmol/L    Anion gap 7 5 - 15 mmol/L    Glucose 374 (H) 65 - 100 mg/dL    BUN 16 6 - 20 MG/DL    Creatinine 0.98 0.70 - 1.30 MG/DL    BUN/Creatinine ratio 16 12 - 20      GFR est AA >60 >60 ml/min/1.73m2    GFR est non-AA >60 >60 ml/min/1.73m2    Calcium 9.0 8.5 - 10.1 MG/DL    Bilirubin, total 0.6 0.2 - 1.0 MG/DL    ALT (SGPT) 24 12 - 78 U/L AST (SGOT) 15 15 - 37 U/L    Alk. phosphatase 118 (H) 45 - 117 U/L    Protein, total 6.9 6.4 - 8.2 g/dL    Albumin 3.8 3.5 - 5.0 g/dL    Globulin 3.1 2.0 - 4.0 g/dL    A-G Ratio 1.2 1.1 - 2.2     TROPONIN I    Collection Time: 12/14/19  2:42 AM   Result Value Ref Range    Troponin-I, Qt. 1.60 (H) <0.05 ng/mL   CK W/ REFLX CKMB    Collection Time: 12/14/19  2:42 AM   Result Value Ref Range     39 - 308 U/L       Recent Labs     12/14/19 0242   WBC 9.2   HGB 15.7   HCT 47.3        Recent Labs     12/14/19 0242      K 4.3      CO2 28   BUN 16   CREA 0.98   *   CA 9.0     Recent Labs     12/14/19 0242   SGOT 15   ALT 24   *   TBILI 0.6   TP 6.9   ALB 3.8   GLOB 3.1       Recent Labs     12/14/19 0242   TROIQ 1.60*       No results for input(s): INR, PTP, APTT, INREXT in the last 72 hours. No results for input(s): PH, PCO2, PO2 in the last 72 hours. Xr Chest Pa Lat    Result Date: 12/14/2019  IMPRESSION: No acute process.            Assessment & Plan:     NSTEMI: with ECG changes, elevated troponin and typical anginal symptoms  -heparin drip  -NPO will likely need a heart cath  -TTE  -continue ASA, metoprolol, statin  -check a1c and lipid panel  - tobacco cessation and the importance of medical compliance    Type 2 DM w/ hyperglycemia:  -start Lantus  -SSI/POC checks    HTN:  -continue home meds    Obesity    DVT ppx: anticoagulated  Code status: full  Disposition: home when ready    Signed By: Samantha Og MD     December 14, 2019

## 2019-12-15 LAB
GLUCOSE BLD STRIP.AUTO-MCNC: 231 MG/DL (ref 65–100)
GLUCOSE BLD STRIP.AUTO-MCNC: 244 MG/DL (ref 65–100)
GLUCOSE BLD STRIP.AUTO-MCNC: 285 MG/DL (ref 65–100)
GLUCOSE BLD STRIP.AUTO-MCNC: 318 MG/DL (ref 65–100)
SERVICE CMNT-IMP: ABNORMAL

## 2019-12-15 PROCEDURE — 74011250637 HC RX REV CODE- 250/637: Performed by: INTERNAL MEDICINE

## 2019-12-15 PROCEDURE — 65660000000 HC RM CCU STEPDOWN

## 2019-12-15 PROCEDURE — 74011636637 HC RX REV CODE- 636/637: Performed by: HOSPITALIST

## 2019-12-15 PROCEDURE — 82962 GLUCOSE BLOOD TEST: CPT

## 2019-12-15 PROCEDURE — 74011250637 HC RX REV CODE- 250/637: Performed by: HOSPITALIST

## 2019-12-15 RX ADMIN — TICAGRELOR 90 MG: 90 TABLET ORAL at 22:11

## 2019-12-15 RX ADMIN — Medication 10 ML: at 15:36

## 2019-12-15 RX ADMIN — METOPROLOL TARTRATE 25 MG: 25 TABLET ORAL at 22:10

## 2019-12-15 RX ADMIN — Medication 10 ML: at 06:14

## 2019-12-15 RX ADMIN — INSULIN LISPRO 7 UNITS: 100 INJECTION, SOLUTION INTRAVENOUS; SUBCUTANEOUS at 11:54

## 2019-12-15 RX ADMIN — INSULIN GLARGINE 20 UNITS: 100 INJECTION, SOLUTION SUBCUTANEOUS at 08:25

## 2019-12-15 RX ADMIN — ATORVASTATIN CALCIUM 40 MG: 40 TABLET, FILM COATED ORAL at 22:10

## 2019-12-15 RX ADMIN — INSULIN LISPRO 4 UNITS: 100 INJECTION, SOLUTION INTRAVENOUS; SUBCUTANEOUS at 08:25

## 2019-12-15 RX ADMIN — INSULIN LISPRO 4 UNITS: 100 INJECTION, SOLUTION INTRAVENOUS; SUBCUTANEOUS at 16:35

## 2019-12-15 RX ADMIN — Medication 10 ML: at 22:10

## 2019-12-15 RX ADMIN — TICAGRELOR 90 MG: 90 TABLET ORAL at 08:26

## 2019-12-15 RX ADMIN — INSULIN LISPRO 5 UNITS: 100 INJECTION, SOLUTION INTRAVENOUS; SUBCUTANEOUS at 22:10

## 2019-12-15 RX ADMIN — METOPROLOL TARTRATE 25 MG: 25 TABLET ORAL at 08:25

## 2019-12-15 RX ADMIN — ASPIRIN 81 MG: 81 TABLET, COATED ORAL at 08:25

## 2019-12-15 NOTE — ROUTINE PROCESS
Bedside and Verbal shift change report given to Matteo Rachel RN (oncoming nurse) by Bryanna Castillo RN (offgoing nurse).  Report included the following information SBAR, MAR, Recent Results and Cardiac Rhythm SB.

## 2019-12-15 NOTE — PROGRESS NOTES
I reviewed pertinent labs and imaging, and discussed /agreed on the plan of care with Dr. Flaquita Nina. Hospitalist Progress Note    NAME: Laya Alejandro   :  1970   MRN:  [id number]     History of Present Illness:  47 y.o man w/ CAD s/p stenting and CABG, DM, HTN, tobacco use, who presents with chest pain. He developed acute-onset sharp substernal chest discomfort, non-radiating, without nausea or dyspnea. This lasted about 20 minutes. He had a similar episode two weeks ago and again a week ago, but these were with exertion. He chews tobacco and admits that he should be on insulin but stopped using it about a year ago. Assessment / Plan:  NSTEMI  Hx of MI s/p CABGx2  · Appreciate cardiology input; plan to cath Monday  · Continue Heparin gtt   · NPO after midnight  · Continue ASA, BB, and statin   · Continue Brilinta   · Trop 1.60->1.87  · ECHO:  Normal cavity size and systolic function (ejection fraction normal). Upper normal wall thickness. Estimated left ventricular ejection fraction is 55 - 60%. Age-appropriate left ventricular diastolic function. Image quality for this study was technically difficult. Type 2 DM with hyperglycemia   · Hgb A1c 11.9  · BS AC&HS  · SSI   · Continue Lantus 20 units daily     HTN  · Continue BB    Morbid obesity   · Follow-up with PCP to discuss weight loss strategies and healthy life style choices   30.0 - 39.9 Obese / Body mass index is 39.73 kg/m². Code status: Full  Prophylaxis: Heparin gtt   Recommended Disposition: Home w/Family     Subjective:     Chief Complaint / Reason for Physician Visit  \"I know what I am suppose to do, I just dont do it. \"  Discussed with RN events overnight.      Review of Systems:  Symptom Y/N Comments  Symptom Y/N Comments   Fever/Chills n   Chest Pain n    Poor Appetite n   Edema n    Cough    Abdominal Pain n    Sputum    Joint Pain     SOB/BALDWIN n   Pruritis/Rash     Nausea/vomit    Tolerating PT/OT     Diarrhea    Tolerating Diet y Constipation    Other       Could NOT obtain due to:      Objective:     VITALS:   Last 24hrs VS reviewed since prior progress note. Most recent are:  Patient Vitals for the past 24 hrs:   Temp Pulse Resp BP SpO2   12/15/19 0733 97.7 °F (36.5 °C) 61 14 124/63 97 %   12/15/19 0330 97.9 °F (36.6 °C) 63 16 122/61 96 %   12/14/19 2303 98.2 °F (36.8 °C) (!) 55 14 101/64 98 %   12/14/19 2302  63  (!) 97/34 96 %   12/14/19 2059  70      12/14/19 2001  60   96 %   12/14/19 2000    120/62    12/14/19 1900 98.5 °F (36.9 °C) 70 14 115/61 97 %   12/14/19 1830  67  114/60 96 %   12/14/19 1800  62  130/68 98 %   12/14/19 1745  62  132/73 99 %   12/14/19 1730  (!) 56  129/65 98 %   12/14/19 1715    120/63    12/14/19 1700    117/63    12/14/19 1647  (!) 58  122/57 99 %   12/14/19 1645  (!) 59  133/73 100 %   12/14/19 1640    112/65    12/14/19 1635  (!) 58  117/70 100 %   12/14/19 1630  (!) 56  124/61 100 %   12/14/19 1615  60  122/67 100 %   12/14/19 1600    108/67    12/14/19 1545    120/65    12/14/19 1530  60  111/68 98 %   12/14/19 1515 97.4 °F (36.3 °C) (!) 56 12 121/60 100 %   12/14/19 1500  (!) 57  113/63 99 %   12/14/19 1445    122/57    12/14/19 1438  (!) 54 11 115/65    12/14/19 1415  (!) 54 10  98 %   12/14/19 1412    109/60    12/14/19 1400  (!) 58 11 109/60 99 %   12/14/19 1345    119/71    12/14/19 1330    118/57    12/14/19 1315    122/61    12/14/19 1300  (!) 56 14 121/66 98 %   12/14/19 1246    127/62    12/14/19 1242 97.9 °F (36.6 °C) (!) 58 (!) 4 127/66 97 %       Intake/Output Summary (Last 24 hours) at 12/15/2019 0947  Last data filed at 12/14/2019 1008  Gross per 24 hour   Intake    Output 450 ml   Net -450 ml        PHYSICAL EXAM:  General: Pleasant obese  male. NAD   EENT:  EOMI. Anicteric sclerae. MMM  Resp:  CTA bilaterally, no wheezing or rales.   No accessory muscle use  CV:  Regular rate rhythm,  No edema  GI:  Soft, Non distended, Non tender.  +Bowel sounds  Neurologic:  Alert and oriented X 3, normal speech,   Psych:   Good insight. Not anxious nor agitated  Skin:  No rashes. No jaundice    Reviewed most current lab test results and cultures  YES  Reviewed most current radiology test results   YES  Review and summation of old records today    NO  Reviewed patient's current orders and MAR    YES  PMH/SH reviewed - no change compared to H&P  ________________________________________________________________________  Care Plan discussed with:    Comments   Patient x    Family  x Wife at bedside    RN x    Care Manager     Consultant                        Multidiciplinary team rounds were held today with , nursing, pharmacist and clinical coordinator. Patient's plan of care was discussed; medications were reviewed and discharge planning was addressed. ________________________________________________________________________  Total NON critical care TIME:  25   Minutes    Total CRITICAL CARE TIME Spent:   Minutes non procedure based      Comments   >50% of visit spent in counseling and coordination of care     ________________________________________________________________________  Gume Ag NP     Procedures: see electronic medical records for all procedures/Xrays and details which were not copied into this note but were reviewed prior to creation of Plan. LABS:  I reviewed today's most current labs and imaging studies.   Pertinent labs include:  Recent Labs     12/14/19  0444 12/14/19  0242   WBC 9.1 9.2   HGB 14.8 15.7   HCT 43.6 47.3    309     Recent Labs     12/14/19  0459 12/14/19  0444 12/14/19  0242   NA  --  139 138   K  --  3.9 4.3   CL  --  105 103   CO2  --  26 28   GLU  --  305* 374*   BUN  --  15 16   CREA  --  0.79 0.98   CA  --  9.1 9.0   ALB  --   --  3.8   TBILI  --   --  0.6   SGOT  --   --  15   ALT  --   --  24   INR 1.0  --   --        Signed: Toni Espinal Hermelindo, NP

## 2019-12-15 NOTE — PROGRESS NOTES
1930 Bedside shift change report given to me  (oncoming nurse) by Hayley Boyd  (offgoing nurse). Report given with SBAR, MAR and Recent Results. 2045 pt up oob with nurse . Ambulated down orozco x1 , no bleeding noted at R groin . Pt c/o tenderness with ambulation no acute pain . 2100 medicated with Tylenol with good results     0700 Bedside shift change report given to Hayley Boyd  (oncoming nurse) by me  (offgoing nurse). Report given with SBAR, MAR and Recent Results.  me

## 2019-12-15 NOTE — PROGRESS NOTES
Cardiology Progress Note      12/15/2019 9:56 AM    Admit Date: 12/14/2019    Admit Diagnosis: NSTEMI (non-ST elevated myocardial infarction) ([facility] [I21.4]      Subjective:     Yenny Ramos is feeling better. No chest pain. LDL 71. Femoral site looks good.     Visit Vitals  /63 (BP 1 Location: Right arm, BP Patient Position: At rest)   Pulse 61   Temp 97.7 °F (36.5 °C)   Resp 14   Ht 6' 1\" (1.854 m)   Wt 301 lb 2.4 oz (136.6 kg)   SpO2 97%   BMI 39.73 kg/m²       Current Facility-Administered Medications   Medication Dose Route Frequency    ticagrelor (BRILINTA) tablet 90 mg  90 mg Oral Q12H    acetaminophen (TYLENOL) tablet 650 mg  650 mg Oral Q4H PRN    ondansetron (ZOFRAN) injection 4 mg  4 mg IntraVENous Q4H PRN    oxyCODONE IR (ROXICODONE) tablet 5 mg  5 mg Oral Q4H PRN    fentaNYL citrate (PF) injection 25 mcg  25 mcg IntraVENous Q4H PRN    aspirin delayed-release tablet 81 mg  81 mg Oral DAILY    atorvastatin (LIPITOR) tablet 40 mg  40 mg Oral QHS    metoprolol tartrate (LOPRESSOR) tablet 25 mg  25 mg Oral Q12H    insulin lispro (HUMALOG) injection   SubCUTAneous AC&HS    glucose chewable tablet 16 g  4 Tab Oral PRN    dextrose (D50W) injection syrg 12.5-25 g  12.5-25 g IntraVENous PRN    glucagon (GLUCAGEN) injection 1 mg  1 mg IntraMUSCular PRN    heparin 25,000 units in D5W 250 ml infusion  7-25 Units/kg/hr IntraVENous TITRATE    insulin glargine (LANTUS) injection 20 Units  20 Units SubCUTAneous DAILY    heparin (porcine) injection 2,000 Units  2,000 Units IntraVENous PRN    Or    heparin (porcine) injection 4,000 Units  4,000 Units IntraVENous PRN    sodium chloride (NS) flush 5-40 mL  5-40 mL IntraVENous Q8H    sodium chloride (NS) flush 5-40 mL  5-40 mL IntraVENous PRN         Objective:      Physical Exam:  Visit Vitals  /63 (BP 1 Location: Right arm, BP Patient Position: At rest)   Pulse 61   Temp 97.7 °F (36.5 °C)   Resp 14   Ht 6' 1\" (1.854 m)   Wt 301 lb 2.4 oz (136.6 kg)   SpO2 97%   BMI 39.73 kg/m²     General Appearance:  Well developed, well nourished,alert and oriented x 3, and individual in no acute distress. Ears/Nose/Mouth/Throat:   Hearing grossly normal.         Neck: Supple. Chest:   Lungs clear to auscultation bilaterally. Cardiovascular:  Regular rate and rhythm, S1, S2 normal, no murmur. Abdomen:   Soft, non-tender, bowel sounds are active. Extremities: No edema bilaterally. Skin: Warm and dry.                Data Review:   Labs:    Recent Results (from the past 24 hour(s))   GLUCOSE, POC    Collection Time: 12/14/19 12:44 PM   Result Value Ref Range    Glucose (POC) 222 (H) 65 - 100 mg/dL    Performed by Keenan HARRIS ADULT COMPLETE    Collection Time: 12/14/19  2:48 PM   Result Value Ref Range    LV E' Lateral Velocity 9.05 cm/s    LV E' Septal Velocity 9.21 cm/s    Ao Root D 3.61 cm    Aortic Valve Systolic Peak Velocity 545.67 cm/s    AoV VTI 19.22 cm    Aortic Valve Area by Continuity of Peak Velocity 3.9 cm2    Aortic Valve Area by Continuity of VTI 3.9 cm2    AoV PG 4.3 mmHg    LVIDd 4.80 4.2 - 5.9 cm    LVPWd 1.42 (A) 0.6 - 1.0 cm    LVIDs 3.06 cm    IVSd 1.47 (A) 0.6 - 1.0 cm    LVOT d 2.19 cm    LVOT Peak Velocity 107.37 cm/s    LVOT Peak Gradient 4.6 mmHg    LVOT VTI 19.78 cm    MVA (PHT) 4.0 cm2    MV A Sunny 52.61 cm/s    MV E Sunny 47.40 cm/s    MV E/A 0.90     MV Mean Gradient 0.4 mmHg    Mitral Valve Annulus Velocity Time Integral 16.58 cm    Left Atrium to Aortic Root Ratio 1.63     Pulmonic Valve Systolic Mean Gradient 2.1 mmHg    Pulmonic Valve Systolic Velocity Time Integral 16.66 cm    Tricuspid Valve E Wave Peak Velocity 57.18 cm/s    Tricuspid Valve A Wave Peak Velocity 47.15 cm/s    Aortic Valve Systolic Mean Gradient 2.9 mmHg    LVOT Cardiac Output 4.4 L/min    LV Mass .0 (A) 88 - 224 g    LV Mass AL Index 134.3 49 - 115 g/m2    E/E' lateral 5.24     E/E' septal 5.15     TR ERO 0.8     MV Peak Gradient 1.2 mmHg E/E' ratio (averaged) 5.19     Mitral Valve E Wave Deceleration Time 210.2 ms    Mitral Valve Pressure Half-time 55.2 ms    Left Atrium Major Axis 5.89 cm    Pulmonic Valve Max Velocity 95.02 cm/s    Mitral Valve Max Velocity 54.35 cm/s    MVA VTI 4.5 cm2    LVOT SV 74.3 ml    Left Ventricular Fractional Shortening by 2D 07.276234462 %    Left Ventricular Outflow Tract Mean Gradient 7.8295042702597 mmHg    Left Ventricular Outflow Tract Mean Velocity 8.97751863960506 cm/s    Mitral Valve Deceleration [place] 2.5373802911769     Mitral valve mean inflow velocity 0.15925044775305 m/s    AV Velocity Ratio 1.03     AV VTI Ratio 1.0     Aortic valve mean velocity 8.61603965707836 m/s    PV peak gradient 3.6 mmHg   GLUCOSE, POC    Collection Time: 12/14/19  4:10 PM   Result Value Ref Range    Glucose (POC) 189 (H) 65 - 100 mg/dL    Performed by Rohith Davis RN    GLUCOSE, POC    Collection Time: 12/14/19  9:52 PM   Result Value Ref Range    Glucose (POC) 273 (H) 65 - 100 mg/dL    Performed by Tim Moran    GLUCOSE, POC    Collection Time: 12/15/19  7:36 AM   Result Value Ref Range    Glucose (POC) 231 (H) 65 - 100 mg/dL    Performed by Rohith Davis RN        Telemetry: normal sinus rhythm      Assessment:     Principal Problem:    NSTEMI (non-ST elevated myocardial infarction) ([facility] [address] (1/6/2016)    Active Problems:    Hypertension ()      Diabetes (HCC) ()      [facility] ()      Overview: 2005 or 2006, PCI/ANDREW with stents to LAD (cosme)      Obesity (1/6/2016)        Plan:     Cath/PCI of protected LM, LCX tomorrow. Radial approach.     Danita Brice MD

## 2019-12-16 LAB
ANION GAP SERPL CALC-SCNC: 8 MMOL/L (ref 5–15)
BUN SERPL-MCNC: 11 MG/DL (ref 6–20)
BUN/CREAT SERPL: 16 (ref 12–20)
CALCIUM SERPL-MCNC: 8.5 MG/DL (ref 8.5–10.1)
CHLORIDE SERPL-SCNC: 108 MMOL/L (ref 97–108)
CO2 SERPL-SCNC: 24 MMOL/L (ref 21–32)
CREAT SERPL-MCNC: 0.69 MG/DL (ref 0.7–1.3)
ERYTHROCYTE [DISTWIDTH] IN BLOOD BY AUTOMATED COUNT: 12.3 % (ref 11.5–14.5)
GLUCOSE BLD STRIP.AUTO-MCNC: 148 MG/DL (ref 65–100)
GLUCOSE BLD STRIP.AUTO-MCNC: 216 MG/DL (ref 65–100)
GLUCOSE BLD STRIP.AUTO-MCNC: 257 MG/DL (ref 65–100)
GLUCOSE BLD STRIP.AUTO-MCNC: 291 MG/DL (ref 65–100)
GLUCOSE SERPL-MCNC: 267 MG/DL (ref 65–100)
HCT VFR BLD AUTO: 42.6 % (ref 36.6–50.3)
HGB BLD-MCNC: 14.2 G/DL (ref 12.1–17)
MCH RBC QN AUTO: 29.5 PG (ref 26–34)
MCHC RBC AUTO-ENTMCNC: 33.3 G/DL (ref 30–36.5)
MCV RBC AUTO: 88.6 FL (ref 80–99)
NRBC # BLD: 0 K/UL (ref 0–0.01)
NRBC BLD-RTO: 0 PER 100 WBC
PLATELET # BLD AUTO: 265 K/UL (ref 150–400)
PMV BLD AUTO: 10.1 FL (ref 8.9–12.9)
POTASSIUM SERPL-SCNC: 3.7 MMOL/L (ref 3.5–5.1)
RBC # BLD AUTO: 4.81 M/UL (ref 4.1–5.7)
SERVICE CMNT-IMP: ABNORMAL
SODIUM SERPL-SCNC: 140 MMOL/L (ref 136–145)
WBC # BLD AUTO: 7.6 K/UL (ref 4.1–11.1)

## 2019-12-16 PROCEDURE — 4A023N7 MEASUREMENT OF CARDIAC SAMPLING AND PRESSURE, LEFT HEART, PERCUTANEOUS APPROACH: ICD-10-PCS | Performed by: INTERNAL MEDICINE

## 2019-12-16 PROCEDURE — C1769 GUIDE WIRE: HCPCS | Performed by: INTERNAL MEDICINE

## 2019-12-16 PROCEDURE — C1887 CATHETER, GUIDING: HCPCS | Performed by: INTERNAL MEDICINE

## 2019-12-16 PROCEDURE — 77030018729 HC ELECTRD DEFIB PAD CARD -B: Performed by: INTERNAL MEDICINE

## 2019-12-16 PROCEDURE — 36415 COLL VENOUS BLD VENIPUNCTURE: CPT

## 2019-12-16 PROCEDURE — 92937 PRQ TRLUML REVSC CAB GRF 1: CPT | Performed by: INTERNAL MEDICINE

## 2019-12-16 PROCEDURE — 74011250637 HC RX REV CODE- 250/637: Performed by: INTERNAL MEDICINE

## 2019-12-16 PROCEDURE — B4101ZZ FLUOROSCOPY OF ABDOMINAL AORTA USING LOW OSMOLAR CONTRAST: ICD-10-PCS | Performed by: INTERNAL MEDICINE

## 2019-12-16 PROCEDURE — 93005 ELECTROCARDIOGRAM TRACING: CPT

## 2019-12-16 PROCEDURE — 65660000000 HC RM CCU STEPDOWN

## 2019-12-16 PROCEDURE — B2111ZZ FLUOROSCOPY OF MULTIPLE CORONARY ARTERIES USING LOW OSMOLAR CONTRAST: ICD-10-PCS | Performed by: INTERNAL MEDICINE

## 2019-12-16 PROCEDURE — 77030015766: Performed by: INTERNAL MEDICINE

## 2019-12-16 PROCEDURE — C1725 CATH, TRANSLUMIN NON-LASER: HCPCS | Performed by: INTERNAL MEDICINE

## 2019-12-16 PROCEDURE — C1894 INTRO/SHEATH, NON-LASER: HCPCS | Performed by: INTERNAL MEDICINE

## 2019-12-16 PROCEDURE — 74011636637 HC RX REV CODE- 636/637: Performed by: HOSPITALIST

## 2019-12-16 PROCEDURE — 74011000250 HC RX REV CODE- 250: Performed by: INTERNAL MEDICINE

## 2019-12-16 PROCEDURE — 74011636320 HC RX REV CODE- 636/320: Performed by: INTERNAL MEDICINE

## 2019-12-16 PROCEDURE — 85347 COAGULATION TIME ACTIVATED: CPT

## 2019-12-16 PROCEDURE — 93459 L HRT ART/GRFT ANGIO: CPT | Performed by: INTERNAL MEDICINE

## 2019-12-16 PROCEDURE — 77030019698 HC SYR ANGI MDLON MRTM -A: Performed by: INTERNAL MEDICINE

## 2019-12-16 PROCEDURE — 77030010221 HC SPLNT WR POS TELE -B: Performed by: INTERNAL MEDICINE

## 2019-12-16 PROCEDURE — 74011250636 HC RX REV CODE- 250/636: Performed by: INTERNAL MEDICINE

## 2019-12-16 PROCEDURE — C1874 STENT, COATED/COV W/DEL SYS: HCPCS | Performed by: INTERNAL MEDICINE

## 2019-12-16 PROCEDURE — 027034Z DILATION OF CORONARY ARTERY, ONE ARTERY WITH DRUG-ELUTING INTRALUMINAL DEVICE, PERCUTANEOUS APPROACH: ICD-10-PCS | Performed by: INTERNAL MEDICINE

## 2019-12-16 PROCEDURE — 74011250637 HC RX REV CODE- 250/637: Performed by: HOSPITALIST

## 2019-12-16 PROCEDURE — 80048 BASIC METABOLIC PNL TOTAL CA: CPT

## 2019-12-16 PROCEDURE — 82962 GLUCOSE BLOOD TEST: CPT

## 2019-12-16 PROCEDURE — 77030008543 HC TBNG MON PRSS MRTM -A: Performed by: INTERNAL MEDICINE

## 2019-12-16 PROCEDURE — 85027 COMPLETE CBC AUTOMATED: CPT

## 2019-12-16 PROCEDURE — 99153 MOD SED SAME PHYS/QHP EA: CPT | Performed by: INTERNAL MEDICINE

## 2019-12-16 PROCEDURE — 93455 CORONARY ART/GRFT ANGIO S&I: CPT | Performed by: INTERNAL MEDICINE

## 2019-12-16 PROCEDURE — 93567 NJX CAR CTH SPRVLV AORTGRPHY: CPT

## 2019-12-16 PROCEDURE — 77030004549 HC CATH ANGI DX PRF MRTM -A: Performed by: INTERNAL MEDICINE

## 2019-12-16 PROCEDURE — 77030028837 HC SYR ANGI PWR INJ COEU -A: Performed by: INTERNAL MEDICINE

## 2019-12-16 PROCEDURE — 77030019569 HC BND COMPR RAD TERU -B: Performed by: INTERNAL MEDICINE

## 2019-12-16 PROCEDURE — 99152 MOD SED SAME PHYS/QHP 5/>YRS: CPT | Performed by: INTERNAL MEDICINE

## 2019-12-16 PROCEDURE — 74011000258 HC RX REV CODE- 258: Performed by: INTERNAL MEDICINE

## 2019-12-16 DEVICE — STENT COR DES 3.00X30MM -- DES RESOLUTE ONYX: Type: IMPLANTABLE DEVICE | Status: FUNCTIONAL

## 2019-12-16 DEVICE — STENT RONYX25015UX RESOLUTE ONYX 2.50X15
Type: IMPLANTABLE DEVICE | Status: FUNCTIONAL
Brand: RESOLUTE ONYX™

## 2019-12-16 RX ORDER — HEPARIN SODIUM 200 [USP'U]/100ML
INJECTION, SOLUTION INTRAVENOUS
Status: COMPLETED | OUTPATIENT
Start: 2019-12-16 | End: 2019-12-16

## 2019-12-16 RX ORDER — VERAPAMIL HYDROCHLORIDE 2.5 MG/ML
INJECTION, SOLUTION INTRAVENOUS AS NEEDED
Status: DISCONTINUED | OUTPATIENT
Start: 2019-12-16 | End: 2019-12-16 | Stop reason: HOSPADM

## 2019-12-16 RX ORDER — LIDOCAINE HYDROCHLORIDE 10 MG/ML
INJECTION, SOLUTION EPIDURAL; INFILTRATION; INTRACAUDAL; PERINEURAL AS NEEDED
Status: DISCONTINUED | OUTPATIENT
Start: 2019-12-16 | End: 2019-12-16 | Stop reason: HOSPADM

## 2019-12-16 RX ORDER — HEPARIN SODIUM 1000 [USP'U]/ML
INJECTION, SOLUTION INTRAVENOUS; SUBCUTANEOUS AS NEEDED
Status: DISCONTINUED | OUTPATIENT
Start: 2019-12-16 | End: 2019-12-16 | Stop reason: HOSPADM

## 2019-12-16 RX ORDER — FENTANYL CITRATE 50 UG/ML
INJECTION, SOLUTION INTRAMUSCULAR; INTRAVENOUS AS NEEDED
Status: DISCONTINUED | OUTPATIENT
Start: 2019-12-16 | End: 2019-12-16 | Stop reason: HOSPADM

## 2019-12-16 RX ORDER — MIDAZOLAM HYDROCHLORIDE 1 MG/ML
INJECTION, SOLUTION INTRAMUSCULAR; INTRAVENOUS AS NEEDED
Status: DISCONTINUED | OUTPATIENT
Start: 2019-12-16 | End: 2019-12-16 | Stop reason: HOSPADM

## 2019-12-16 RX ADMIN — METOPROLOL TARTRATE 25 MG: 25 TABLET ORAL at 22:22

## 2019-12-16 RX ADMIN — TICAGRELOR 90 MG: 90 TABLET ORAL at 08:33

## 2019-12-16 RX ADMIN — INSULIN LISPRO 4 UNITS: 100 INJECTION, SOLUTION INTRAVENOUS; SUBCUTANEOUS at 22:22

## 2019-12-16 RX ADMIN — INSULIN LISPRO 3 UNITS: 100 INJECTION, SOLUTION INTRAVENOUS; SUBCUTANEOUS at 19:43

## 2019-12-16 RX ADMIN — Medication 10 ML: at 22:22

## 2019-12-16 RX ADMIN — TICAGRELOR 90 MG: 90 TABLET ORAL at 22:22

## 2019-12-16 RX ADMIN — INSULIN GLARGINE 20 UNITS: 100 INJECTION, SOLUTION SUBCUTANEOUS at 08:33

## 2019-12-16 RX ADMIN — INSULIN LISPRO 4 UNITS: 100 INJECTION, SOLUTION INTRAVENOUS; SUBCUTANEOUS at 08:34

## 2019-12-16 RX ADMIN — METOPROLOL TARTRATE 25 MG: 25 TABLET ORAL at 08:33

## 2019-12-16 RX ADMIN — ASPIRIN 81 MG: 81 TABLET, COATED ORAL at 08:33

## 2019-12-16 RX ADMIN — INSULIN LISPRO 7 UNITS: 100 INJECTION, SOLUTION INTRAVENOUS; SUBCUTANEOUS at 12:08

## 2019-12-16 RX ADMIN — ATORVASTATIN CALCIUM 40 MG: 40 TABLET, FILM COATED ORAL at 22:22

## 2019-12-16 RX ADMIN — Medication 10 ML: at 07:29

## 2019-12-16 NOTE — CARDIO/PULMONARY
Cardiac Rehab Note: chart review S/p stenting 12/14/2019 for NSTEMI on 12/14/2019 To have another procedure tomorrow 12/17/2019. Smoking history assessed. Pt is . Tobacco cessation added to AVS 
EF 55-60%  on 12/14/2019 per echo MI education folder, heart attack, heart heathy diet, warning signs, heart facts, and out patient cardiac rehab program to Laya Alejandro on 12/16/2019 Educated using teach back method. Reviewed MI diagnosis definition and discussed risk factors for CAD to include the following: family history, elevated BMI, hyperlipidemia, hypertension, diabetes, and stress. Discussed Heart Healthy/Low Sodium (less than 2000 mg)/diabetic diet. Reviewed the importance of medication compliance(Brilinta), follow up appointments with cardiologist, signs and symptoms of angina, and what to report to physician after discharge. Emphasized the value of cardiac rehab. Pt was previous participant in 2016. States he is in decision making mode as he knows he needs to do something but wants to think on it after next procedure tomorrow. Laya Alejandro verbalized understanding with questions answered.   Jessica Davenport RN

## 2019-12-16 NOTE — PROGRESS NOTES
Hospitalist Progress Note    NAME: Savage Lindsay   :  1970   MRN:  [id number]       Assessment / Plan:    NSTEMI POA  Hx of MI s/p CABGx2  ECHO:  Normal cavity size and systolic function (ejection fraction normal). Upper normal wall thickness. Estimated left ventricular ejection fraction is 55 - 60%. Age-appropriate left ventricular diastolic function. Image quality for this study was technically difficult. -Trop 1.60->1.87  -NPO  -Continue Heparin gtt   -Continue ASA, BB, statin, brilinta  -Appreciate cardiology input; plan to cath today       Type 2 DM uncontrolled with hyperglycemia   Non compliance, not taking insulin for >8 months for financial reasons  -Hgb A1c 11.9  -BS AC&HS  -SSI   -Continue Lantus 20 units daily for now. Pt says he cannot afford Lantus, consider affordable options on discharge     HTN  -Continue BB    Obesity   Body mass index is 39.73 kg/m².  -discussed weight loss strategies and healthy life style choices        Code status: Full  Prophylaxis: Heparin gtt   Surrogate decision maker: Wife  Recommended Disposition: Home w/Family             Subjective:     Chief Complaint / Reason for Physician Visit  Seen and examined  Sitting in chair, no CP or SOB  No acute complaints or issues overnight  Wife at bedside, plan of care discussed    Discussed with RN events overnight. Review of Systems:  Symptom Y/N Comments  Symptom Y/N Comments   Fever/Chills n   Chest Pain n    Poor Appetite n   Edema     Cough    Abdominal Pain n    Sputum    Joint Pain     SOB/BALDWIN n   Pruritis/Rash     Nausea/vomit    Tolerating PT/OT     Diarrhea n   Tolerating Diet y    Constipation    Other       Could NOT obtain due to:      Objective:     VITALS:   Last 24hrs VS reviewed since prior progress note.  Most recent are:  Patient Vitals for the past 24 hrs:   Temp Pulse Resp BP SpO2   19 1150 97.6 °F (36.4 °C) (!) 58 18 116/68 93 %   19 0723 97.8 °F (36.6 °C) 64 18 132/73 93 %   19 0300 97.3 °F (36.3 °C) (!) 59 14 133/69 97 %   12/15/19 2300 97.5 °F (36.4 °C) 67 16 114/71 97 %   12/15/19 1900 97.6 °F (36.4 °C) 67 16 133/70 98 %   12/15/19 1534 98.2 °F (36.8 °C) 63 16 118/70 97 %     No intake or output data in the 24 hours ending 12/16/19 1216     PHYSICAL EXAM:  General: WD, WN. Alert, cooperative, no acute distress    EENT:  EOMI. Anicteric sclerae. MMM  Resp:  CTA bilaterally  CV:  Regular  rhythm,  No edema  GI:  Soft, Non distended, Non tender.  +Bowel sounds  Neurologic:  Alert and oriented X 3, normal speech  Psych:   Good insight. Not anxious nor agitated  Skin:  No rashes. No jaundice    Reviewed most current lab test results and cultures  YES  Reviewed most current radiology test results   YES  Review and summation of old records today    NO  Reviewed patient's current orders and MAR    YES  PMH/SH reviewed - no change compared to H&P  ________________________________________________________________________  Care Plan discussed with:    Comments   Patient x    Family  x wife   RN x    Care Manager     Consultant                        Multidiciplinary team rounds were held today with , nursing, pharmacist and clinical coordinator. Patient's plan of care was discussed; medications were reviewed and discharge planning was addressed. ________________________________________________________________________  Total NON critical care TIME:   Minutes    Total CRITICAL CARE TIME Spent:   Minutes non procedure based      Comments   >50% of visit spent in counseling and coordination of care     ________________________________________________________________________  Mervin Huffman MD     Procedures: see electronic medical records for all procedures/Xrays and details which were not copied into this note but were reviewed prior to creation of Plan. LABS:  I reviewed today's most current labs and imaging studies.   Pertinent labs include:  Recent Labs     12/16/19  0348 12/14/19 0444 12/14/19 0242   WBC 7.6 9.1 9.2   HGB 14.2 14.8 15.7   HCT 42.6 43.6 47.3    279 309     Recent Labs     12/16/19 0348 12/14/19 0459 12/14/19 0444 12/14/19 0242     --  139 138   K 3.7  --  3.9 4.3     --  105 103   CO2 24  --  26 28   *  --  305* 374*   BUN 11  --  15 16   CREA 0.69*  --  0.79 0.98   CA 8.5  --  9.1 9.0   ALB  --   --   --  3.8   TBILI  --   --   --  0.6   SGOT  --   --   --  15   ALT  --   --   --  24   INR  --  1.0  --   --        Signed: Kathia Mckeon MD

## 2019-12-16 NOTE — PROGRESS NOTES
1900: Received report from day shift nurse Piotr Velasco. Reviewed SBAR, Kardex, I/O, MAR, Procedural information and Recent results. VSS, NSR (rhythm), RA (oxygenation). A/O X 4  Pt sitting up in the chair. Family at bedside. Cath lab site:  CDI, no bleeding/hematoma. Pt reporting no CP/SOB. 2200: , 5 units coverage given. Evening meds given. 2300: VSS, NSR, RA, no complaints. Pt reports no CP/SOB.     0300: VSS, NSR, RA, no complaints. Pt reports no CP/SOB. Bedside and Verbal shift change report given to day shift nurse [address] [place] [address] (oncoming nurse) by Sera Swenson RN (offgoing nurse). Report included the following information: SBAR, Kardex, Intake/Output, MAR, Procedural information, and Recent Results.

## 2019-12-16 NOTE — PROGRESS NOTES
ALEX  Home   Follow up appointments    Reason for Admission:   NSTEMI                   RRAT Score:       6              Plan for utilizing home health:     Qualifying dx for [facility]; patient declined. Not homebound                    Current Advanced Directive/Advance Care Plan:   Not on file                         Transition of Care Plan:         Home  Follow up appointments    CM met with patient to confirm demographics. Pt name and  confirmed as pt identifiers. Patient is , lives in a single story home, steps for entry. ADL's/IADL's - independent pta to include steps and driving  DME - none  Preferred Rx - [facility] and David Early  [facility] - previous experience    Care Management Interventions  PCP Verified by CM: Yes  Mode of Transport at Discharge: Other (see comment)(family)  Transition of Care Consult (CM Consult): Discharge Planning(qulaifying dx for [facility] - patient has declined)  Discharge Durable Medical Equipment: No  Physical Therapy Consult: No  Occupational Therapy Consult: No  Speech Therapy Consult: No  Current Support Network: Lives with Spouse(1 story home, steps for entry with guardrail)  Confirm Follow Up Transport: Family  Discharge Location  Discharge Placement: Home    PCP requires patient to call and schedule hospital follow up.     Michel Everett, RN CM  Ext

## 2019-12-16 NOTE — PROGRESS NOTES
Problem: Falls - Risk of  Goal: *Absence of Falls  Description  Document Amarilys Weston Fall Risk and appropriate interventions in the flowsheet.   Outcome: Progressing Towards Goal  Note: Fall Risk Interventions:            Medication Interventions: Teach patient to arise slowly, Patient to call before getting OOB

## 2019-12-16 NOTE — PROGRESS NOTES
Problem: Falls - Risk of  Goal: *Absence of Falls  Description  Document Alon Junior Fall Risk and appropriate interventions in the flowsheet.   Outcome: Progressing Towards Goal  Note: Fall Risk Interventions:            Medication Interventions: Teach patient to arise slowly, Patient to call before getting OOB                   Problem: Cath Lab Procedures: Post-Cath Day of Procedure (Initiate SCIP Measures for Post-Op Care)  Goal: Off Pathway (Use only if patient is Off Pathway)  Outcome: Progressing Towards Goal  Goal: Activity/Safety  Outcome: Progressing Towards Goal  Goal: Discharge Planning  Outcome: Progressing Towards Goal  Goal: Medications  Outcome: Progressing Towards Goal  Goal: *Procedure site is without bleeding and signs of infection six hours post sheath removal  Outcome: Progressing Towards Goal  Goal: *Hemodynamically stable  Outcome: Progressing Towards Goal  Goal: *Optimal pain control at patient's stated goal  Outcome: Progressing Towards Goal

## 2019-12-16 NOTE — ROUTINE PROCESS
Bedside and Verbal shift change report given to Dave Box RN (oncoming nurse) by Dave Box RN (offgoing nurse). Report included the following information SBAR and Cardiac Rhythm SR/SB.

## 2019-12-16 NOTE — PROGRESS NOTES
Problem: Falls - Risk of  Goal: *Absence of Falls  Description  Document Rebbecca Persons Fall Risk and appropriate interventions in the flowsheet.   12/16/2019 1813 by Mayte Waller RN  Outcome: Progressing Towards Goal  Note: Fall Risk Interventions:            Medication Interventions: Teach patient to arise slowly, Patient to call before getting OOB                12/16/2019 0822 by Mayte Waller RN  Outcome: Progressing Towards Goal  Note: Fall Risk Interventions:            Medication Interventions: Teach patient to arise slowly, Patient to call before getting OOB

## 2019-12-16 NOTE — PROGRESS NOTES
12/16/2019 2:20 PM    Admit Date: 12/14/2019    Admit Diagnosis:   NSTEMI (non-ST elevated myocardial infarction) ([facility]) [I21.4]    Subjective:     Phoebe Balding denies chest pain or shortness of breath. Current Facility-Administered Medications   Medication Dose Route Frequency    ticagrelor (BRILINTA) tablet 90 mg  90 mg Oral Q12H    acetaminophen (TYLENOL) tablet 650 mg  650 mg Oral Q4H PRN    ondansetron (ZOFRAN) injection 4 mg  4 mg IntraVENous Q4H PRN    oxyCODONE IR (ROXICODONE) tablet 5 mg  5 mg Oral Q4H PRN    fentaNYL citrate (PF) injection 25 mcg  25 mcg IntraVENous Q4H PRN    aspirin delayed-release tablet 81 mg  81 mg Oral DAILY    atorvastatin (LIPITOR) tablet 40 mg  40 mg Oral QHS    metoprolol tartrate (LOPRESSOR) tablet 25 mg  25 mg Oral Q12H    insulin lispro (HUMALOG) injection   SubCUTAneous AC&HS    glucose chewable tablet 16 g  4 Tab Oral PRN    dextrose (D50W) injection syrg 12.5-25 g  12.5-25 g IntraVENous PRN    glucagon (GLUCAGEN) injection 1 mg  1 mg IntraMUSCular PRN    heparin 25,000 units in D5W 250 ml infusion  7-25 Units/kg/hr IntraVENous TITRATE    insulin glargine (LANTUS) injection 20 Units  20 Units SubCUTAneous DAILY    heparin (porcine) injection 2,000 Units  2,000 Units IntraVENous PRN    Or    heparin (porcine) injection 4,000 Units  4,000 Units IntraVENous PRN    sodium chloride (NS) flush 5-40 mL  5-40 mL IntraVENous Q8H    sodium chloride (NS) flush 5-40 mL  5-40 mL IntraVENous PRN         Objective:      Physical Exam:    Visit Vitals  /68   Pulse (!) 58   Temp 97.6 °F (36.4 °C)   Resp 18   Ht 6' 1\" (1.854 m)   Wt 301 lb 2.4 oz (136.6 kg)   SpO2 93%   BMI 39.73 kg/m²     Gen:  NAD  Mental Status - Alert. General Appearance - Not in acute distress. Chest and Lung Exam   Inspection: Accessory muscles - No use of accessory muscles in breathing.    Auscultation:   Breath sounds: - Normal.   Cardiovascular   Inspection: Jugular vein - Bilateral - Inspection Normal.   Palpation/Percussion:   Apical Impulse: - Normal.   Auscultation: Rhythm - Regular. Heart Sounds - S1 WNL and S2 WNL. No S3 or S4. Murmurs & Other Heart Sounds: Auscultation of the heart reveals - No Murmurs. Peripheral Vascular   Upper Extremity: Inspection - Bilateral - No Cyanotic nailbeds or Digital clubbing. Lower Extremity:   Palpation: Edema - Bilateral - No edema. right groin no bruit or hematoma   Abdomen:   Soft, non-tender, bowel sounds are active. Neuro: A&O times 3, CN and motor grossly WNL    Data Review:   Recent Labs     12/16/19 0348 12/14/19 0444 12/14/19  0242   WBC 7.6 9.1 9.2   HGB 14.2 14.8 15.7   HCT 42.6 43.6 47.3    279 309     Recent Labs     12/16/19  0348 12/14/19  0459 12/14/19 0444 12/14/19  0242     --  139 138   K 3.7  --  3.9 4.3     --  105 103   CO2 24  --  26 28   *  --  305* 374*   BUN 11  --  15 16   CREA 0.69*  --  0.79 0.98   CA 8.5  --  9.1 9.0   ALB  --   --   --  3.8   TBILI  --   --   --  0.6   SGOT  --   --   --  15   ALT  --   --   --  24   INR  --  1.0  --   --        Recent Labs     12/14/19 0444 12/14/19  0242   TROIQ 1.87* 1.60*       No intake or output data in the 24 hours ending 12/16/19 1420     Telemetry: normal sinus rhythm      Assessment:     Principal Problem:    [facility]non[facility]ST [facility] ([facility])    Active Problems:    Hypertension ()      Diabetes (HCC) ()      MI (myocardial infarction) ([facility] ()      Overview: 2005 or 2006, PCI/ANDREW with stents to LAD (cosme)      Obesity (1/6/2016)        Plan:     NSTEMI:  · Continue aspirin, beta-blocker, statin, Brilinta  · Status post PTCA and ANDREW to RCA  · It appears that the SVG to the OM is occluded   · plan on staged intervention to the left main/left circumflex today. · I discussed the risks and benefits of cardiac catheterization +/- PCI with the patient who expressed understanding and wishes to proceed.

## 2019-12-17 VITALS
HEIGHT: 73 IN | OXYGEN SATURATION: 96 % | BODY MASS INDEX: 39.91 KG/M2 | RESPIRATION RATE: 16 BRPM | WEIGHT: 301.15 LBS | DIASTOLIC BLOOD PRESSURE: 78 MMHG | SYSTOLIC BLOOD PRESSURE: 137 MMHG | HEART RATE: 71 BPM | TEMPERATURE: 97.6 F

## 2019-12-17 PROBLEM — Z95.5 S/P CORONARY ARTERY STENT PLACEMENT: Status: ACTIVE | Noted: 2019-12-17

## 2019-12-17 LAB
ACT BLD: 340 SECS (ref 79–138)
ATRIAL RATE: 54 BPM
CALCULATED P AXIS, ECG09: 18 DEGREES
CALCULATED T AXIS, ECG11: 72 DEGREES
DIAGNOSIS, 93000: NORMAL
GLUCOSE BLD STRIP.AUTO-MCNC: 189 MG/DL (ref 65–100)
P-R INTERVAL, ECG05: 186 MS
Q-T INTERVAL, ECG07: 412 MS
QRS DURATION, ECG06: 82 MS
QTC CALCULATION (BEZET), ECG08: 390 MS
SERVICE CMNT-IMP: ABNORMAL
VENTRICULAR RATE, ECG03: 54 BPM

## 2019-12-17 PROCEDURE — 74011250637 HC RX REV CODE- 250/637: Performed by: HOSPITALIST

## 2019-12-17 PROCEDURE — 82962 GLUCOSE BLOOD TEST: CPT

## 2019-12-17 PROCEDURE — 74011636637 HC RX REV CODE- 636/637: Performed by: HOSPITALIST

## 2019-12-17 PROCEDURE — 74011250637 HC RX REV CODE- 250/637: Performed by: INTERNAL MEDICINE

## 2019-12-17 RX ORDER — ATORVASTATIN CALCIUM 40 MG/1
40 TABLET, FILM COATED ORAL
Qty: 30 TAB | Refills: 0 | Status: SHIPPED | OUTPATIENT
Start: 2019-12-17

## 2019-12-17 RX ORDER — ASPIRIN 81 MG/1
81 TABLET ORAL DAILY
Qty: 30 TAB | Refills: 0 | Status: SHIPPED | OUTPATIENT
Start: 2019-12-17 | End: 2019-12-17

## 2019-12-17 RX ORDER — ASPIRIN 81 MG/1
81 TABLET ORAL DAILY
Qty: 30 TAB | Refills: 11 | Status: SHIPPED | OUTPATIENT
Start: 2019-12-17 | End: 2020-12-16

## 2019-12-17 RX ADMIN — INSULIN LISPRO 3 UNITS: 100 INJECTION, SOLUTION INTRAVENOUS; SUBCUTANEOUS at 08:36

## 2019-12-17 RX ADMIN — ASPIRIN 81 MG: 81 TABLET, COATED ORAL at 08:35

## 2019-12-17 RX ADMIN — INSULIN GLARGINE 20 UNITS: 100 INJECTION, SOLUTION SUBCUTANEOUS at 08:36

## 2019-12-17 RX ADMIN — TICAGRELOR 90 MG: 90 TABLET ORAL at 08:36

## 2019-12-17 RX ADMIN — METOPROLOL TARTRATE 25 MG: 25 TABLET ORAL at 08:36

## 2019-12-17 NOTE — PROGRESS NOTES
ALEX: Home with follow up appointments/ Family in room to transport home    Met with patient and family in room to discuss discharge plans  Pt walking around room, reports that he has no needs prior to discharge. CM informed him of his appointments scheduled with Jaren Carlos on January 27, 2019 at 1:45pm.  He was informed to make his own follow up with PCP. Pt's family present in room and will provide discharge transportation. No other needs identified for CM at this time, pt ready to discharge from a CM standpoint. Care Management Interventions  PCP Verified by CM: Yes  Mode of Transport at Discharge: Other (see comment)(family)  Transition of Care Consult (CM Consult): Discharge Planning(qulaifying dx for [facility] - patient has declined)  Discharge Durable Medical Equipment: No  Physical Therapy Consult: No  Occupational Therapy Consult: No  Speech Therapy Consult: No  Current Support Network: Lives with Spouse(1 story home, steps for entry with guardrail)  Confirm Follow Up Transport: Family  Discharge [facility]  [facility] [address] [facility] [address] [place] [facility]  Advanced Steps ACP Facilitator  Zone Phone: [phone]      Mobile: [phone]

## 2019-12-17 NOTE — DISCHARGE SUMMARY
Hospitalist Discharge Summary     Patient ID:  Brad Amos  [id number]  47 y.o.  1970 12/14/2019    PCP on record: Cornel Primrose, MD    Admit date: 12/14/2019  Discharge date and time: 12/17/2019    DISCHARGE DIAGNOSIS:    NSTEMI POA- s/p Cath/PCI/stent- cont ASA, Brilinta, Lipitor, Metoprolol, lisinopril  Hx of MI s/p CABGx2- patent bypass graft  Type 2 DM uncontrolled with hyperglycemia POA - OP follow up with PCP for further assistance  Non compliance POA- insulin changed to more affordable 70/30 combination Q 12, pt understands he had to cont control on his DM to improved his survival with CAD  HTN  Obesity- weight loss recommended  Full code    CONSULTATIONS:  IP CONSULT TO CARDIOLOGY    Excerpted HPI from H&P of Yamileth Harry MD:  Shin.Ashing y.o man w/ CAD s/p stenting and CABG, DM, HTN, tobacco use, who presents with chest pain. He developed acute-onset sharp substernal chest discomfort, non-radiating, without nausea or dyspnea. This lasted about 20 minutes. He had a similar episode two weeks ago and again a week ago, but these were with exertion. He chews tobacco and admits that he should be on insulin but stopped using it about a year ago. \"    ______________________________________________________________________  DISCHARGE SUMMARY/HOSPITAL COURSE:  for full details see H&P, daily progress notes, labs, consult notes. NSTEMI POA  Hx of MI s/p CABGx2  ECHO:  Normal cavity size and systolic function (ejection fraction normal). Upper normal wall thickness. Estimated left ventricular ejection fraction is 55 - 60%. Age-appropriate left ventricular diastolic function. Image quality for this study was technically difficult.   -Trop 1.60->1.87  -NPO  -Continue Heparin gtt   -Continue ASA, BB, statin, brilinta  -Appreciate cardiology input; plan to cath today        Type 2 DM uncontrolled with hyperglycemia   Non compliance, not taking insulin for >8 months for financial reasons  -Hgb A1c 11.9  -BS AC&HS  -SSI   -Continue Lantus 20 units daily for now. Pt says he cannot afford Lantus, consider affordable options on discharge     HTN  -Continue BB     Obesity   Body mass index is 39.73 kg/m².  -discussed weight loss strategies and healthy life style choices         Code status: Full        _______________________________________________________________________  Patient seen and examined by me on discharge day. Pertinent Findings:  Gen:    Not in distress  Chest: Clear lungs  CVS:   Regular rhythm. No edema  Abd:  Soft, not distended, not tender  Neuro:  Alert, Oriented x 3  _______________________________________________________________________  DISCHARGE MEDICATIONS:   Current Discharge Medication List      START taking these medications    Details   insulin nph-regular human rec (HUMULIN 70-30) 100 unit/mL (70-30) inpn 40 Units by SubCUTAneous route ACB/HS. Qty: 1 Adjustable Dose Pre-filled Pen Syringe, Refills: 1      ticagrelor (BRILINTA) 90 mg tablet Take 1 Tab by mouth every twelve (12) hours every twelve (12) hours. Qty: 60 Tab, Refills: 1         CONTINUE these medications which have CHANGED    Details   aspirin delayed-release (ECOTRIN LOW STRENGTH) 81 mg tablet Take 1 Tab by mouth daily. Qty: 30 Tab, Refills: 0      atorvastatin (LIPITOR) 40 mg tablet Take 1 Tab by mouth nightly. Qty: 30 Tab, Refills: 0         CONTINUE these medications which have NOT CHANGED    Details   metFORMIN (GLUMETZA) 1,000 mg TG24 24 hour tablet Take 1,000 mg by mouth.      metoprolol tartrate (LOPRESSOR) 25 mg tablet TAKE 1 TABLET BY MOUTH EVERY 12 HOURS  Qty: 60 Tab, Refills: 0    Comments: 1/26/17 no further refills until seen in office      lisinopril (PRINIVIL, ZESTRIL) 5 mg tablet Take 1 Tab by mouth daily.  Indications: HYPERTENSION  Qty: 30 Tab, Refills: 11      glucose blood VI test strips (ONETOUCH ULTRA TEST) strip Use four times daily as directed  Qty: 100 Strip, Refills: 1 Insulin Needles, Disposable, (BD INSULIN PEN NEEDLE UF MINI) 31 gauge x 3/16\" ndle Use as directed. May substitute with 5/16\"  Qty: 100 Pen Needle, Refills: 1      lancets (ONETOUCH DELICA LANCETS) 30 gauge misc Use twice daily as directed  Qty: 100 Package, Refills: 1         STOP taking these medications       insulin lispro (HUMALOG) 100 unit/mL injection Comments:   Reason for Stopping:         insulin glargine (LANTUS) 100 unit/mL injection Comments:   Reason for Stopping:                 Patient Follow Up Instructions: Activity: Activity as tolerated  Diet: Cardiac Diet, Diabetic Diet and Low fat, Low cholesterol  Wound Care: Keep wound clean and dry    Follow-up with Dr René Harper ([facility] cardiology)  in 2 weeks.     Follow-up Information     Follow up With Specialties Details Why Contact Tati Aguilar MD Family Practice  PCP - requires that patient call to schedule hospital follow up. [address] [facility]  [phone]      Tracie Vickers MD Cardiology, [address] [place] [address] Vascular Surgery, Internal Medicine  Cardiology - hospital follow up  [address] [facility]  [phone]          ________________________________________________________________    Risk of deterioration: Low    Condition at Discharge:  Stable  __________________________________________________________________    Disposition  Home with family, no needs    ____________________________________________________________________    Code Status: Full Code  ___________________________________________________________________      Total time in minutes spent coordinating this discharge (includes going over instructions, follow-up, prescriptions, and preparing report for sign off to her PCP) :  36 minutes    Signed:  Liz Anderson MD

## 2019-12-17 NOTE — PROGRESS NOTES
Received report from day shift [address] [place] [address]. Reviewed SBAR, Kardex, I/O, MAR, Procedural information and Recent results. VSS, NRS (rhythm), RA (oxygenation). A/O X 4  Pt resting in bed/sitting up in the bed. Family at bedside. Cath lab site: RR- TR band- CDI, no bleeding/hematoma. PH at 12 cc of air, TR band to come off at 2100. Pt reporting no CP/SOB. 2100: TR band removed. CDI, no bleeding/hematoma. Gauze/tegaderm to cover. VSS, NSR, no complaints. 2215:     2300: VSS, NSR, RA, no complaints. Pt reports no CP/SOB.     0300: VSS, NSR, RA, no complaints. Pt reports no CP/SOB. 0700: Bedside and Verbal shift change report given to day shift nurse Meg (oncoming nurse) by Christopher Flores RN (offgoing nurse). Report included the following information: SBAR, Kardex, Intake/Output, MAR, Procedural information, and Recent Results.

## 2019-12-17 NOTE — PROGRESS NOTES
12/17/2019 2:20 PM    Admit Date: 12/14/2019    Admit Diagnosis:   NSTEMI (non-ST elevated myocardial infarction) ([facility] [I21.4]    Subjective:     Theoplis Lipa denies chest pain or shortness of breath. Current Facility-Administered Medications   Medication Dose Route Frequency    ticagrelor (BRILINTA) tablet 90 mg  90 mg Oral Q12H    acetaminophen (TYLENOL) tablet 650 mg  650 mg Oral Q4H PRN    ondansetron (ZOFRAN) injection 4 mg  4 mg IntraVENous Q4H PRN    oxyCODONE IR (ROXICODONE) tablet 5 mg  5 mg Oral Q4H PRN    fentaNYL citrate (PF) injection 25 mcg  25 mcg IntraVENous Q4H PRN    aspirin delayed-release tablet 81 mg  81 mg Oral DAILY    atorvastatin (LIPITOR) tablet 40 mg  40 mg Oral QHS    metoprolol tartrate (LOPRESSOR) tablet 25 mg  25 mg Oral Q12H    insulin lispro (HUMALOG) injection   SubCUTAneous AC&HS    glucose chewable tablet 16 g  4 Tab Oral PRN    dextrose (D50W) injection syrg 12.5-25 g  12.5-25 g IntraVENous PRN    glucagon (GLUCAGEN) injection 1 mg  1 mg IntraMUSCular PRN    heparin 25,000 units in D5W 250 ml infusion  7-25 Units/kg/hr IntraVENous TITRATE    insulin glargine (LANTUS) injection 20 Units  20 Units SubCUTAneous DAILY    heparin (porcine) injection 2,000 Units  2,000 Units IntraVENous PRN    Or    heparin (porcine) injection 4,000 Units  4,000 Units IntraVENous PRN    sodium chloride (NS) flush 5-40 mL  5-40 mL IntraVENous Q8H    sodium chloride (NS) flush 5-40 mL  5-40 mL IntraVENous PRN         Objective:      Physical Exam:    Visit Vitals  /78 (BP 1 Location: Left arm, BP Patient Position: At rest)   Pulse 71   Temp 97.6 °F (36.4 °C)   Resp 16   Ht 6' 1\" (1.854 m)   Wt 301 lb 2.4 oz (136.6 kg)   SpO2 96%   BMI 39.73 kg/m²     Gen:  NAD  Mental Status - Alert. General Appearance - Not in acute distress. Chest and Lung Exam   Inspection: Accessory muscles - No use of accessory muscles in breathing.    Auscultation:   Breath sounds: - Normal. Cardiovascular   Inspection: Jugular vein - Bilateral - Inspection Normal.   Palpation/Percussion:   Apical Impulse: - Normal.   Auscultation: Rhythm - Regular. Heart Sounds - S1 WNL and S2 WNL. No S3 or S4. Murmurs & Other Heart Sounds: Auscultation of the heart reveals - No Murmurs. Peripheral Vascular   Upper Extremity: Inspection - Bilateral - No Cyanotic nailbeds or Digital clubbing. right radial intact, uncomplicated   Lower Extremity:   Palpation: Edema - Bilateral - No edema. right groin no bruit or hematoma   Abdomen:   Soft, non-tender, bowel sounds are active. Neuro: A&O times 3, CN and motor grossly WNL    Data Review:   Recent Labs     12/16/19  0348   WBC 7.6   HGB 14.2   HCT 42.6        Recent Labs     12/16/19  0348      K 3.7      CO2 24   *   BUN 11   CREA 0.69*   CA 8.5       No results for input(s): TROIQ, CPK, CKMB in the last 72 hours. No intake or output data in the 24 hours ending 12/17/19 0955     Telemetry: normal sinus rhythm      Assessment:     Principal Problem:    NSTEMI (non-ST elevated myocardial infarction) ([facility] (1/6/2016)    Active Problems:    Hypertension ()      Diabetes ([facility] ()      [facility] ()      Overview: 2005 or 2006, PCI/ANDREW with stents to LAD (cosme)      Obesity (1/6/2016)        Plan:     NSTEMI:  · Continue aspirin, beta-blocker, statin, Brilinta  · Status post PTCA and ANDREW to RCA  · SVG to the OM is occluded, S/p PTCA and drug-eluting stents from the left main into the proximal circumflex, with a separate stent in the distal circumflex on 12/16/2019. · Discussed lifestyle modification and new medications. Patient expresses understanding of the critical importance of uninterrupted DAPT for at least 1 year post stent, understands risk of myocardial infarction and death if noncompliant with DAPT. · Encouraged cardiac rehab, and he has a pamphlet and the number to call.

## 2019-12-17 NOTE — CARDIO/PULMONARY
Cardiac Rehab Note: chart review MI education folder, heart attack, heart heathy diet, warning signs, heart facts, catheterization brochure, and out patient cardiac rehab program to Vasyl Sherwood on 12/16/2019 Educated using teach back method. Reviewed MI diagnosis definition and purpose of intervention. Discussed risk factors for CAD to include the following: family history, elevated BMI, hyperlipidemia, hypertension, diabetes, and stress. Emphasized the value of cardiac rehab. Discussed Cardiac Rehab Program format, benefits, and encouraged enrollment to assist with risk modification and management. Initial Cardiac Rehab Program intake appointment scheduled for February 3,2020 at 0900 and appointment information is on the AVS. Patient provided orientation packet, instructed to complete packet a couple days prior to appointment, wear gym appropriate clothing and shoes, and bring current list of medications. Patient is to call if unable to keep appointment, need to reschedule or additional questions. Pt previously participated in Cardiac Rehab. Vasyl Sherwood verbalized understanding with questions answered.   Tamela Obrien RN

## 2019-12-17 NOTE — DISCHARGE INSTRUCTIONS
HOSPITALIST DISCHARGE INSTRUCTIONS    NAME: Jaden Owens   :  1970   MRN:  [id number]     Date/Time:  2019 7:49 AM    ADMIT DATE: 2019     DISCHARGE DATE: 2019     DISCHARGE DIAGNOSIS:  NSTEMI POA- s/p Cath/PCI/stent- cont ASA, Brilinta, Lipitor, Metoprolol, lisinopril  Hx of MI s/p CABGx2- patent bypass graft  Type 2 DM uncontrolled with hyperglycemia POA - OP follow up with PCP for further assistance  Non compliance POA- insulin changed to more affordable 70/30 combination Q 12, pt understands he had to cont control on his DM to improved his survival with CAD  HTN  Obesity- weight loss recommended  Full code    Principal Problem:    NSTEMI (non-ST elevated myocardial infarction) ([facility] (2016)    Active Problems:    Hypertension ()      Diabetes ([facility] ()      Obesity (2016)      CAD (coronary artery disease) (2016)      S/P CABG x 2 (2016)      Overview: OFF PUMP CABG X 2 LIMA to LAD, RSVG to OM2      RIGHT ENDOSCOPIC SAPHENOUS VEIN HARVEST      S/P coronary artery stent placement (2019)      Overview: 19 PCI/ANDREW to distal LCX, proximal LCx exgending into the LM; Widely       patent stents in the proximal to distal right coronary artery.  or , PCI/ANDREW with stents to LAD (cosme)               MEDICATIONS:  As per medication reconciliation  list  · It is important that you take the medication exactly as they are prescribed. · Keep your medication in the bottles provided by the pharmacist and keep a list of the medication names, dosages, and times to be taken in your wallet. · Do not take other medications without consulting your doctor.      Pain Management: per above medications    What to do at Home    Recommended diet:  Cardiac Diet, Diabetic Diet and Low fat, Low cholesterol    Recommended activity: Activity as tolerated    If you have questions regarding the hospital related prescriptions or hospital related issues please call [facility] at . If you experience any of the following symptoms then please call your primary care physician or return to the emergency room if you cannot get hold of your doctor:  Fever, chills, nausea, vomiting, diarrhea, change in mentation, falling, bleeding, shortness of breath,     Follow Up:  Dr. Giles Humphreys MD  you are to call and set up an appointment to see them in 7-10 days for medical management of DM as discussed  Dr Elias Sanders ([facility] cardiology) in 2 weeks for post NSTEMI follow up      Information obtained by :  I understand that if any problems occur once I am at home I am to contact my physician. I understand and acknowledge receipt of the instructions indicated above. Physician's or R.N.'s Signature                                                                  Date/Time                                                                                                                                              Patient or Representative Signature                                                          Date/Time    Smoking Cessation Program:   This is a free, phone/text/email based, smoking cessation program. The program is individualized to meet each patient's needs. To enroll use this link [email or web address] [facility]  [address] [place] [phone]      Patient ID:  Jaden Owens  [id number]  47 y.o.  1970    Admit Date: 12/14/2019    Discharge Date: 12/17/2019     Admission Diagnoses:   NSTEMI (non-ST elevated myocardial infarction) [facility]) [I21.4]    Discharge Diagnoses:   Principal Problem:    NSTEMI (non-ST elevated myocardial infarction) ([address] (1/6/2016)    Active Problems:    Hypertension ()      Diabetes ([facility] ()      Obesity (1/6/2016)      CAD (coronary artery disease) (1/8/2016)      S/P CABG x 2 (1/8/2016)      Overview: OFF PUMP CABG X 2 LIMA to LAD, RSVG to OM2      RIGHT ENDOSCOPIC SAPHENOUS VEIN HARVEST      S/P coronary artery stent placement (12/17/2019)      Overview: 12/16/19 PCI/ANDREW to distal LCX, proximal LCx exgending into the LM; Widely       patent stents in the proximal to distal right coronary artery. 2005 or 2006, PCI[facility]              Discharge Condition: Good    Cardiology Procedures this Admission:  Left heart catheterization with PCI    Disposition: home    Reference discharge instructions provided by nursing for diet and activity. Signed:  Aaron Medina NP  12/17/2019  10:19 AM      Radial Cardiac Catheterization/Angiography Discharge Instructions    It is normal to feel tired the first couple days. Take it easy and follow the physicians instructions. CHECK THE CATHETER INSERTION SITE DAILY:    Remove the wrist dressing 24 hours after the procedure. You may shower 24 hours after the procedure. Wash with soap and water and pat dry. Gentle cleaning of the site with soap and water is sufficient, cover with a dry clean dressing or bandage. Do not apply creams or powders to the area. No soaking the wrist for 3 days. Leave the puncture site open to air after 24 hours post-procedure. CALL THE PHYSICIANS:     If the site becomes red, swollen or feels warm to the touch  If there is bleeding or drainage or if there is unusual pain at the radial site. If there is any minor oozing, you may apply a band-aid and remove after 12 hours. If the bleeding continues, hold pressure with the middle finger against the puncture site and the thumb against the back of the wrist,call 911 to be transported to the hospital.  DO NOT DRIVE YOURSELF, [address].     ACTIVITY:   For the first 24 hours do not manipulate the wrist.  No lifting, pushing or pulling over 3-5 pounds with the affected wrist for 7 daysand no straining the insertion site. Do not life grocery bags or the garbage can, do not run the vacuum  or  for 7 days. Start with short walks as in the hospital and gradually increase as tolerated each day. It is recommended to walk 30 minutes 5-7 days per week. Follow your physicians instructions on activity. Avoid walking outside in extremes of heat or cold. Walk inside when it is cold and windy or hot and humid. Things to keep in mind:  No driving for at least 24 hours, or as designated by your physician. Limit the number of times you go up and down the stairs  Take rests and pace yourself with activity. Be careful and do not strain with bowel movements. MEDICATIONS:    Take all medications as prescribed  Call your physician if you have any questions  Keep an updated list of your medications with you at all times and give a list to your physician and pharmacist    SIGNS AND SYMPTOMS:   Be cautious of symptoms of angina or recurrent symptoms such as chest discomfort, unusual shortness of breath or fatigue. These could be symptoms of restenosis, a new blockage or a heart attack. If your symptoms are relieved with rest it is still recommended that you notify your physician of recurrent chest pain or discomfort. For CHEST PAIN or symptoms of angina not relieved with rest:  If the discomfort is not relieved with rest, and you have been prescribed Nitroglycerin, take as directed (taken under the tongue, one at a time 5 minutes apart for a total of 3 doses). If the discomfort is not relieved after the 3rd nitroglycerin, call 911. If you have not been prescribed Nitroglycerin  and your chest discomfort is not relieved with rest, call 911. AFTER CARE:   Follow up with your physician as instructed.   Follow a heart healthy diet with proper portion control, daily stress management, daily exercise, blood pressure and cholesterol control , and smoking cessation.

## 2019-12-31 NOTE — PROGRESS NOTES
Ewelina Santiago, [facility]    Subjective/HPI:     Mr. Madyson Mccauley is a 47 y.o. male is here for routine f/u. He has a PMHx of CAD s/p CABGx2, DM2, HTN, HLD and tobacco abuse. He was admitted to [facility] from 12/14 - 12/17/2019 with NSTEMI. He had admitted to non-compliance with medications, particularly not having used insulin for at least 8 months due to cost.  He had cardiac cath done on 12/14, which showed multivessel stenosis of the RCA, LM extending into the LCx and mid LCx arteries. VG to OM was occluded, however the LIMA remained patent. He underwent staged, multivessel PCI to the RCA, LCx and LM on 12/14 and 12/16. His insulin was resumed during hospitalization. Medication compliance was strongly encouraged. He was discharged in stable condition. He feels much better since discharge. He has changed his diet and has joined [employer]. He met with a  and is planning on working out about 3-4x/week. He has been compliant with insulin, and blood sugars are better. His goal is to come off insulin completely in a year. He otherwise denies complaints of chest pains, dizziness, orthopnea, PND or edema. He denies shortness of breath or palpitation symptoms.          PCP Provider  Melissa Goltz, MD    Patient Active Problem List   Diagnosis Code    NSTEMI (non-ST elevated myocardial infarction) ([facility] I21.4    Hypertension I10    Diabetes ([facility] E11.9    MI (myocardial infarction) ([facility] I21.9    Obesity E66.9     Z78.9    Type II diabetes mellitus, uncontrolled ([facility] E11.65    CAD (coronary artery disease) I25.10    S/P CABG x 2 Z95.1    S/P coronary artery stent placement Z95.5     Past Surgical History:   Procedure Laterality Date    HX CORONARY STENT PLACEMENT      HX ORTHOPAEDIC  [id number]    scopes knees, ankles,      Family History   Problem Relation Age of Onset    Heart Disease Mother     Heart Disease Maternal Grandmother      Social History     Socioeconomic History    Marital status:      Spouse name: Not on file    Number of children: Not on file    Years of education: Not on file    Highest education level: Not on file   Occupational History    Not on file   Social Needs    Financial resource strain: Not on file    Food insecurity:     Worry: Not on file     Inability: Not on file    Transportation needs:     Medical: Not on file     Non-medical: Not on file   Tobacco Use    Smoking status: Never Smoker    Smokeless tobacco: Current User   Substance and Sexual Activity    Alcohol use: No     Comment: may be twice a year    Drug use: Never    Sexual activity: Not on file   Lifestyle    Physical activity:     Days per week: Not on file     Minutes per session: Not on file    Stress: Not on file   Relationships    Social connections:     Talks on phone: Not on file     Gets together: Not on file     Attends Latter day service: Not on file     Active member of club or organization: Not on file     Attends meetings of clubs or organizations: Not on file     Relationship status: Not on file    Intimate partner violence:     Fear of current or ex partner: Not on file     Emotionally abused: Not on file     Physically abused: Not on file     Forced sexual activity: Not on file   Other Topics Concern    Not on file   Social History Narrative    Not on file       Allergies   Allergen Reactions    Latex Rash        Current Outpatient Medications   Medication Sig    insulin nph-regular human rec (HUMULIN 70-30) 100 unit/mL (70-30) inpn 40 Units by SubCUTAneous route ACB/HS.  atorvastatin (LIPITOR) 40 mg tablet Take 1 Tab by mouth nightly.  aspirin delayed-release (ECOTRIN LOW STRENGTH) 81 mg tablet Take 1 Tab by mouth daily.  ticagrelor (BRILINTA) 90 mg tablet Take 1 Tab by mouth every twelve (12) hours every twelve (12) hours.     metFORMIN (GLUMETZA) 1,000 mg TG24 24 hour tablet Take 1,000 mg by mouth two (2) times a day.    metoprolol tartrate (LOPRESSOR) 25 mg tablet TAKE 1 TABLET BY MOUTH EVERY 12 HOURS    glucose blood VI test strips (ONETOUCH ULTRA TEST) strip Use four times daily as directed    Insulin Needles, Disposable, (BD INSULIN PEN NEEDLE UF MINI) 31 gauge x 3/16\" ndle Use as directed. May substitute with 5/16\"    lisinopril (PRINIVIL, ZESTRIL) 5 mg tablet Take 1 Tab by mouth daily. Indications: HYPERTENSION    lancets (ONETOUCH DELICA LANCETS) 30 gauge misc Use twice daily as directed     No current facility-administered medications for this visit. I have reviewed the problem list, allergy list, medical history, family, social history and medications. Review of Symptoms:    Review of Systems   Constitutional: Negative for chills, fever and weight loss. HENT: Negative for nosebleeds. Eyes: Negative for blurred vision and double vision. Respiratory: Negative for cough, shortness of breath and wheezing. Cardiovascular: Negative for chest pain, palpitations, orthopnea, leg swelling and PND. Gastrointestinal: Negative for abdominal pain, blood in stool, diarrhea, nausea and vomiting. Musculoskeletal: Negative for joint pain. Skin: Negative for rash. Neurological: Negative for dizziness, tingling and loss of consciousness. Endo/Heme/Allergies: Does not bruise/bleed easily. Physical Exam:      General: Well developed, in no acute distress, cooperative and alert  HEENT: No carotid bruits, no JVD, trach is midline. Neck Supple, PEERL, EOM intact. Heart:  reg rate and rhythm; normal S1/S2; no murmurs, gallops or rubs. Respiratory: Clear bilaterally x 4, no wheezing or rales  Abdomen:   Soft, non-tender, no distention, no masses. + BS. Extremities:  Normal cap refill, no cyanosis, atraumatic. No edema. Neuro: A&Ox3, speech clear, gait stable. Skin: Skin color is normal. No rashes or lesions.  Non diaphoretic  Vascular: 2+ pulses symmetric in all extremities    Vitals: 01/13/20 1329 01/13/20 1341   BP: 120/82 118/80   Pulse: 76    Resp: 18    SpO2: 99%    Weight: 303 lb 4.8 oz (137.6 kg)    Height: 6' 1\" (1.854 m)        Cardiographics    ECG: sinus rhythm  Results for orders placed or performed during the hospital encounter of 12/14/19   EKG, 12 LEAD, INITIAL   Result Value Ref Range    Ventricular Rate 62 BPM    Atrial Rate 62 BPM    P-R Interval 180 ms    QRS Duration 84 ms    Q-T Interval 396 ms    QTC Calculation (Bezet) 401 ms    Calculated P Axis 22 degrees    Calculated R Axis 23 degrees    Calculated T Axis 68 degrees    Diagnosis       Normal sinus rhythm  Anteroseptal infarct , age undetermined    Confirmed by Hari Canales PPenelopeVPenelope ([id number]) on 12/14/2019 6:56:48 PM         Cardiology Labs:  Lab Results   Component Value Date/Time    Cholesterol, total 139 12/14/2019 02:42 AM    HDL Cholesterol 38 12/14/2019 02:42 AM    LDL, calculated 71.4 12/14/2019 02:42 AM    Triglyceride 148 12/14/2019 02:42 AM    CHOL/HDL Ratio 3.7 12/14/2019 02:42 AM       Lab Results   Component Value Date/Time    Sodium 140 12/16/2019 03:48 AM    Potassium 3.7 12/16/2019 03:48 AM    Chloride 108 12/16/2019 03:48 AM    CO2 24 12/16/2019 03:48 AM    Anion gap 8 12/16/2019 03:48 AM    Glucose 267 (H) 12/16/2019 03:48 AM    BUN 11 12/16/2019 03:48 AM    Creatinine 0.69 (L) 12/16/2019 03:48 AM    BUN/Creatinine ratio 16 12/16/2019 03:48 AM    GFR est AA >60 12/16/2019 03:48 AM    GFR est non-AA >60 12/16/2019 03:48 AM    Calcium 8.5 12/16/2019 03:48 AM    Bilirubin, total 0.6 12/14/2019 02:42 AM    AST (SGOT) 15 12/14/2019 02:42 AM    Alk.  phosphatase 118 (H) 12/14/2019 02:42 AM    Protein, total 6.9 12/14/2019 02:42 AM    Albumin 3.8 12/14/2019 02:42 AM    Globulin 3.1 12/14/2019 02:42 AM    A-G Ratio 1.2 12/14/2019 02:42 AM    ALT (SGPT) 24 12/14/2019 02:42 AM        Orders Placed This Encounter    AMB POC EKG ROUTINE W/ 12 LEADS, INTER & REP     Order Specific Question:   Reason for Exam:     Answer: routine        Assessment:     Assessment:       ICD-10-CM ICD-9-CM    1. Coronary artery disease involving coronary bypass graft of native heart without angina pectoris I25.810 414.05 AMB POC EKG ROUTINE W/ 12 LEADS, INTER & REP   2. Essential hypertension I10 401.9 AMB POC EKG ROUTINE W/ 12 LEADS, INTER & REP   3. Mixed hyperlipidemia E78.2 272.2 AMB POC EKG ROUTINE W/ 12 LEADS, INTER & REP   4. S/P CABG x 2 Z95.1 V45.81 AMB POC EKG ROUTINE W/ 12 LEADS, INTER & REP        Plan:     1. Coronary artery disease involving coronary bypass graft of native heart without angina pectoris  S/p CABGx2  S/p staged, multivessel ANDREW to the RCA, LM and prox-mid LCx in the setting of NSTEMI in 12/2019  Echo done 12/2019 with preserved ejection fraction 55-60% with no significant valvular pathology  He will continue BB, statin therapy. Discussed importance of uninterrupted ASA and Brilinta therapy for at least 1 year. He is able to afford Brilinta -- co-pay card provided as well. 2. Essential hypertension  BP controlled. Continue anti-hypertensive therapy and low sodium diet    3. Mixed hyperlipidemia  LDL 71 in 12/2019  Continue statin therapy; encouraged low fat, low cholesterol diet    4.  S/P CABG x 2  S/p CABGx2 in 1/2016 with LIMA to LAD and VG to OM2    F/u with Dr. Kimberlee Pierce in 3 months    Eather Lesches, NP

## 2020-01-13 ENCOUNTER — OFFICE VISIT (OUTPATIENT)
Dept: CARDIOLOGY CLINIC | Age: 50
End: 2020-01-13

## 2020-01-13 VITALS
RESPIRATION RATE: 18 BRPM | OXYGEN SATURATION: 99 % | WEIGHT: 303.3 LBS | BODY MASS INDEX: 40.2 KG/M2 | HEIGHT: 73 IN | HEART RATE: 76 BPM | DIASTOLIC BLOOD PRESSURE: 80 MMHG | SYSTOLIC BLOOD PRESSURE: 118 MMHG

## 2020-01-13 DIAGNOSIS — Z95.1 S/P CABG X 2: ICD-10-CM

## 2020-01-13 DIAGNOSIS — E78.2 MIXED HYPERLIPIDEMIA: ICD-10-CM

## 2020-01-13 DIAGNOSIS — I25.810 CORONARY ARTERY DISEASE INVOLVING CORONARY BYPASS GRAFT OF NATIVE HEART WITHOUT ANGINA PECTORIS: Primary | ICD-10-CM

## 2020-01-13 DIAGNOSIS — I10 ESSENTIAL HYPERTENSION: ICD-10-CM

## 2020-01-13 NOTE — PROGRESS NOTES
1. Have you been to the ER, urgent care clinic since your last visit? Hospitalized since your last visit? Seen on 12/14/19. 2. Have you seen or consulted any other health care providers outside of the [address] [place] [address] since your last visit? Include any pap smears or colon screening.    No.      Chief Complaint   Patient presents with   [facility] Follow Up     from cardiac cath-pt denies any cardiac symptoms

## 2020-02-03 ENCOUNTER — APPOINTMENT (OUTPATIENT)
Dept: CARDIAC REHAB | Age: 50
End: 2020-02-03
Attending: INTERNAL MEDICINE

## 2020-04-20 ENCOUNTER — VIRTUAL VISIT (OUTPATIENT)
Dept: CARDIOLOGY CLINIC | Age: 50
End: 2020-04-20

## 2020-04-20 VITALS — BODY MASS INDEX: 37.11 KG/M2 | WEIGHT: 280 LBS | HEIGHT: 73 IN

## 2020-04-20 DIAGNOSIS — Z95.1 S/P CABG X 2: ICD-10-CM

## 2020-04-20 DIAGNOSIS — I10 ESSENTIAL HYPERTENSION: ICD-10-CM

## 2020-04-20 DIAGNOSIS — E78.2 MIXED HYPERLIPIDEMIA: ICD-10-CM

## 2020-04-20 DIAGNOSIS — I25.10 ASHD (ARTERIOSCLEROTIC HEART DISEASE): Primary | ICD-10-CM

## 2020-04-20 DIAGNOSIS — I21.4 NSTEMI (NON-ST ELEVATED MYOCARDIAL INFARCTION) (HCC): ICD-10-CM

## 2020-04-20 RX ORDER — SILDENAFIL 100 MG/1
100 TABLET, FILM COATED ORAL AS NEEDED
COMMUNITY
Start: 2020-01-23

## 2020-04-20 NOTE — PROGRESS NOTES
Loring Dubin is a 47 y.o. male who was seen by synchronous (real-time) audio-video technology on 4/20/2020     [address] [place] [address]  [phone]     Subjective:      Loring Dubin is a 47 y.o. male is here for routine f/u. The patient denies chest pain/ shortness of breath, orthopnea, PND, LE edema, palpitations, syncope, or presyncope. Patient Active Problem List    Diagnosis Date Noted    S/P coronary artery stent placement 12/17/2019    CAD (coronary artery disease) 01/08/2016    S/P CABG x 2 01/08/2016    NSTEMI (non-ST elevated myocardial infarction) ([address] 01/06/2016    Obesity 01/06/2016     01/06/2016    Type II diabetes mellitus, uncontrolled ([address] 01/06/2016    Hypertension     Diabetes ([address]     MI [facility]myocardial infarction) ([facility])       Kayden Wang MD  Past Medical History:   Diagnosis Date    Diabetes ([address]     Hypertension     MI (myocardial infarction) ([address]     NSTEMI (non-ST elevated myocardial infarction) ([address] 1/6/2016    Obesity 1/6/2016    S/P coronary artery stent placement 12/17/2019    Widely patent stents in the proximal to distal right coronary artery. Previously widely patent LIMA to LAD not reimaged for this catheterization. 100% occluded proximal LAD, critically diseased left main extending into the left circumflex, with high-grade tubular stenosis of the distal circumflex prior to OM 3.  Successful PTCA and drug-eluting stent to the distal left circumflex and then the proxi     1/6/2016    Type II diabetes mellitus, uncontrolled ([address] 1/6/2016      Past Surgical History:   Procedure Laterality Date    HX CORONARY STENT PLACEMENT      HX ORTHOPAEDIC  [id number]    scopes knees, ankles,      Allergies   Allergen Reactions    Latex Rash      Family History   Problem Relation Age of Onset    Heart Disease Mother     Heart Disease Maternal Grandmother       Social History Socioeconomic History    Marital status:      Spouse name: Not on file    Number of children: Not on file    Years of education: Not on file    Highest education level: Not on file   Occupational History    Not on file   Social Needs    Financial resource strain: Not on file    Food insecurity     Worry: Not on file     Inability: Not on file    Transportation needs     Medical: Not on file     Non-medical: Not on file   Tobacco Use    Smoking status: Never Smoker    Smokeless tobacco: Current User   Substance and Sexual Activity    Alcohol use: No     Comment: may be twice a year    Drug use: Never    Sexual activity: Not on file   Lifestyle    Physical activity     Days per week: Not on file     Minutes per session: Not on file    Stress: Not on file   Relationships    Social connections     Talks on phone: Not on file     Gets together: Not on file     Attends Latter day service: Not on file     Active member of club or organization: Not on file     Attends meetings of clubs or organizations: Not on file     Relationship status: Not on file    Intimate partner violence     Fear of current or ex partner: Not on file     Emotionally abused: Not on file     Physically abused: Not on file     Forced sexual activity: Not on file   Other Topics Concern    Not on file   Social History Narrative    Not on file      Current Outpatient Medications   Medication Sig    sildenafil citrate (VIAGRA) 100 mg tablet Take 100 mg by mouth as needed.  insulin nph-regular human rec (HUMULIN 70-30) 100 unit/mL (70-30) inpn 40 Units by SubCUTAneous route ACB/HS. (Patient taking differently: 20 Units by SubCUTAneous route ACB/HS. Sliding scale 20-30)    atorvastatin (LIPITOR) 40 mg tablet Take 1 Tab by mouth nightly.  aspirin delayed-release (ECOTRIN LOW STRENGTH) 81 mg tablet Take 1 Tab by mouth daily.     ticagrelor (BRILINTA) 90 mg tablet Take 1 Tab by mouth every twelve (12) hours every twelve (12) hours.  metFORMIN (GLUMETZA) 1,000 mg TG24 24 hour tablet Take 1,000 mg by mouth two (2) times a day.  metoprolol tartrate (LOPRESSOR) 25 mg tablet TAKE 1 TABLET BY MOUTH EVERY 12 HOURS    glucose blood VI test strips (ONETOUCH ULTRA TEST) strip Use four times daily as directed    Insulin Needles, Disposable, (BD INSULIN PEN NEEDLE UF MINI) 31 gauge x 3/16\" ndle Use as directed. May substitute with 5/16\"    lisinopril (PRINIVIL, ZESTRIL) 5 mg tablet Take 1 Tab by mouth daily. Indications: HYPERTENSION    lancets (ONETOUCH DELICA LANCETS) 30 gauge misc Use twice daily as directed     No current facility-administered medications for this visit. Review of Symptoms:  11 systems reviewed, negative other than as stated in the HPI    Physical Exam:    Vitals:    04/20/20 1525   Weight: 280 lb (127 kg)   Height: 6' 1\" (1.854 m)     See patient reported vital signs in nursing note as applicable. Body mass index is 36.94 kg/m². General PE  Gen:  NAD  Mental Status - Alert. General Appearance - Not in acute distress. HEENT:  PER, EOM, no JVD  Chest and Lung Exam   Inspection: Accessory muscles - No use of accessory muscles in breathing. No audible wheezing. Normal effort in breathing. Symmetric excursion. Cardiovascular:  No JVD  Upper Extremity: Inspection - Bilateral - No Cyanotic nailbeds or Digital clubbing. Lower Extremity:   Palpation: Edema - Bilateral - No edema. Neuro: A&O times 3, CN and motor grossly WNL  Skin: no rashes or lesions on exposed skin, dry, intact  Psych: normal mood, affect. Speech clear.     Labs:   Lab Results   Component Value Date/Time    Cholesterol, total 139 12/14/2019 02:42 AM    Cholesterol, total 217 (H) 01/07/2016 03:55 AM    HDL Cholesterol 38 12/14/2019 02:42 AM    HDL Cholesterol 30 01/07/2016 03:55 AM    LDL, calculated 71.4 12/14/2019 02:42 AM    LDL, calculated 119.8 (H) 01/07/2016 03:55 AM    Triglyceride 148 12/14/2019 02:42 AM    Triglyceride 336 (H) 01/07/2016 03:55 AM    CHOL/HDL Ratio 3.7 12/14/2019 02:42 AM    CHOL/HDL Ratio 7.2 (H) 01/07/2016 03:55 AM     Lab Results   Component Value Date/Time     01/06/2016 05:49 AM     Lab Results   Component Value Date/Time    Sodium 140 12/16/2019 03:48 AM    Potassium 3.7 12/16/2019 03:48 AM    Chloride 108 12/16/2019 03:48 AM    CO2 24 12/16/2019 03:48 AM    Anion gap 8 12/16/2019 03:48 AM    Glucose 267 (H) 12/16/2019 03:48 AM    BUN 11 12/16/2019 03:48 AM    Creatinine 0.69 (L) 12/16/2019 03:48 AM    BUN/Creatinine ratio 16 12/16/2019 03:48 AM    GFR est AA >60 12/16/2019 03:48 AM    GFR est non-AA >60 12/16/2019 03:48 AM    Calcium 8.5 12/16/2019 03:48 AM    Bilirubin, total 0.6 12/14/2019 02:42 AM    AST (SGOT) 15 12/14/2019 02:42 AM    Alk. phosphatase 118 (H) 12/14/2019 02:42 AM    Protein, total 6.9 12/14/2019 02:42 AM    Albumin 3.8 12/14/2019 02:42 AM    Globulin 3.1 12/14/2019 02:42 AM    A-G Ratio 1.2 12/14/2019 02:42 AM    ALT (SGPT) 24 12/14/2019 02:42 AM          Assessment:      1. ASHD (arteriosclerotic heart disease)    2. S/P CABG x 2    3. Essential hypertension    4. NSTEMI (non-ST elevated myocardial infarction) ([facility]    5. Mixed hyperlipidemia        Orders Placed This Encounter    sildenafil citrate (VIAGRA) 100 mg tablet     Sig: Take 100 mg by mouth as needed. Plan:     Patient presents for a telemedicine visit.  Last seen in clinic 1/2020.       ASHD  S/p CABGx2 in 1/2016 with LIMA to LAD and VG to OM2  S/p staged, multivessel ANDREW to the RCA, LM and prox-mid LCx in the setting of NSTEMI in 12/2019  Continue uninterrupted ASA and Brilinta therapy for at least 1 year- likely change to Brilinta 60 mg p.o. twice daily at the one-year anniversary of his stent which was on 12/16/2019  Continue BB statin     Echo done 12/2019 with preserved ejection fraction 55-60% with no significant valvular pathology    HTN  Controlled with current therapy---BB, Ace-I      HLD  12/19 LDL at 71 On statin    DM  On Insulin regimen. A1C down to 6.4, from 12+, not taking insulin at that time. Congratulated on his progress. Tobacco:  Quit Dipping in 12/2019- congratulated. Counseled on diet and exercise- eventual goal of 30-60 minutes 5-7 times a week as per AHA guidelines. He had been going to [employer], but now is doing exercise bike at home. Congratulated. Continue current care and f/u on about 12/16/2020, the one year anniversary of his NSTEMI. Marky Jennings MD    This virtual visit was conducted with audiovisual connection using [employer] telemedicine services. Pursuant to the emergency declaration under the [facility] waiver authority and the [employer] Act, this Virtual Visit was conducted, with patient's consent, to reduce the patient's risk of exposure to COVID-19 and provide continuity of care for an established patient. Services were provided through a video synchronous discussion virtually to substitute for in-person clinic visit.

## 2020-04-20 NOTE — PROGRESS NOTES
Chief Complaint   Patient presents with    Follow-up    Coronary Artery Disease    Hypertension    Cholesterol Problem    Has arthritis  knee questions if ok to use tumeric.

## (undated) DEVICE — MINI TREK™ II CORONARY DILATATION CATHETER 2.00 MM X 15 MM / OVER-THE-WIRE: Brand: MINI TREK™

## (undated) DEVICE — CATHETER ANGIO JL4 AD 5 FRX100 CM PERFORMA

## (undated) DEVICE — TR BAND RADIAL ARTERY COMPRESSION DEVICE: Brand: TR BAND

## (undated) DEVICE — TREK CORONARY DILATATION CATHETER 2.50 MM X 12 MM / RAPID-EXCHANGE: Brand: TREK

## (undated) DEVICE — HI-TORQUE VERSACORE FLOPPY GUIDE WIRE SYSTEM 145 CM: Brand: HI-TORQUE VERSACORE

## (undated) DEVICE — CATH GUID COR EB35 6FR 100CM -- LAUNCHER

## (undated) DEVICE — MINI TREK CORONARY DILATATION CATHETER 1.20 MM X 15 MM / RAPID-EXCHANGE: Brand: MINI TREK

## (undated) DEVICE — PINNACLE INTRODUCER SHEATH: Brand: PINNACLE

## (undated) DEVICE — BLADE ASSEMB CLP HAIR FINE --

## (undated) DEVICE — Device: Brand: ASAHI SION BLUE

## (undated) DEVICE — TUBING PRSS MON L6IN PVC M FEM CONN

## (undated) DEVICE — MINI TREK CORONARY DILATATION CATHETER 1.50 MM X 15 MM / RAPID-EXCHANGE: Brand: MINI TREK

## (undated) DEVICE — CATH GUID .056IN 6FR 150CM -- GUIDELINER V3

## (undated) DEVICE — CATHETER ETER ANGIO L110CM OD5FR ID046IN L75CM 038IN 145DEG CARD

## (undated) DEVICE — PACK PROCEDURE SURG HRT CATH

## (undated) DEVICE — SYRINGE ANGIO 10 CC BRL STD PRNT POLYCARB LT BLU MEDALLION

## (undated) DEVICE — SPLINT WR POS F/ARTERIAL ACC -- BX/10

## (undated) DEVICE — SYR ART 700 CLEAR MARK 7 -- ARTERION

## (undated) DEVICE — Device: Brand: FIELDER XT

## (undated) DEVICE — AIRLIFE™  ADULT CUSHION NASAL CANNULA WITH 7 FOOT (2.1 M) CRUSH-RESISTANT OXYGEN TUBING, AND U/CONNECT-IT ADAPTER: Brand: AIRLIFE™

## (undated) DEVICE — SET ADMIN IV PMP 20GTT 117IN -- 2 NDLS INJ SITE

## (undated) DEVICE — NC TREK CORONARY DILATATION CATHETER 3.0 MM X 8 MM / RAPID-EXCHANGE: Brand: NC TREK

## (undated) DEVICE — CATHETER GUID 6FR L100CM DIA0.071IN NYL SHFT RBU4.0 W/O

## (undated) DEVICE — 3M™ TEGADERM™ TRANSPARENT FILM DRESSING FRAME STYLE, 1626W, 4 IN X 4-3/4 IN (10 CM X 12 CM), 50/CT 4CT/CASE: Brand: 3M™ TEGADERM™

## (undated) DEVICE — HI-TORQUE PILOT 150 GUIDE WIRE .014 STRAIGHT TIP 3.0 CM X 300 CM: Brand: HI-TORQUE PILOT

## (undated) DEVICE — TREK CORONARY DILATATION CATHETER 3.0 MM X 20 MM / RAPID-EXCHANGE: Brand: TREK

## (undated) DEVICE — RUNTHROUGH NS EXTRA FLOPPY PTCA GUIDEWIRE: Brand: RUNTHROUGH

## (undated) DEVICE — Z DISCONTINUED NO SUB IDED SET EXTN W/ 4 W STPCOCK M SPIN LOK 36IN

## (undated) DEVICE — SUTURE PERMAHAND SZ 2-0 L18IN NONABSORBABLE BLK L26MM PS 1588H

## (undated) DEVICE — PRESSURE MONITORING SET: Brand: TRUWAVE

## (undated) DEVICE — Device: Brand: QUICK-CROSS SUPPORT CATHETER

## (undated) DEVICE — NC TREK CORONARY DILATATION CATHETER 2.5 MM X 20 MM / RAPID-EXCHANGE: Brand: NC TREK

## (undated) DEVICE — GUIDEWIRE VASC L145CM 0.035IN J TIP L3MM PTFE FIX COR NAMIC

## (undated) DEVICE — MINI TREK CORONARY DILATATION CATHETER 2.0 MM X 15 MM / RAPID-EXCHANGE: Brand: MINI TREK

## (undated) DEVICE — KIT ANGIOGRAPHY CUST MRMC

## (undated) DEVICE — CATHETER ETER DIAG L110CM OD5FR VASC PGTL COR AD PERFORMA

## (undated) DEVICE — CATHETER ANGIO JR4 AD 5 FRX100 CM 25 CM PERFORMA

## (undated) DEVICE — IRRIGATION KT PIST SYR 60ML -- CONVERT TO ITEM 116415

## (undated) DEVICE — MEDI-TRACE CADENCE ADULT, DEFIBRILLATION ELECTRODE -RTS  (10 PR/PK) - PHILIPS: Brand: MEDI-TRACE CADENCE

## (undated) DEVICE — CATHETER ANGIO LCB AD 5 FRX100 CM PERFORMA

## (undated) DEVICE — GLIDESHEATH SLENDER ACCESS KIT: Brand: GLIDESHEATH SLENDER

## (undated) DEVICE — ANGIOGRAPHY KIT CUST [K0910930B] [MERIT MEDICAL SYSTEMS INC]

## (undated) DEVICE — SYR POWER 150ML 8IN FILL TUBE --

## (undated) DEVICE — RADIFOCUS OPTITORQUE ANGIOGRAPHIC CATHETER: Brand: OPTITORQUE

## (undated) DEVICE — COVER LT HNDL BLU PLAS

## (undated) DEVICE — Device: Brand: PROWATER

## (undated) DEVICE — GUIDEWIRE VASC L260CM 0.035IN J TIP L3MM PTFE FIX COR NAMIC